# Patient Record
Sex: MALE | Race: OTHER | HISPANIC OR LATINO | URBAN - METROPOLITAN AREA
[De-identification: names, ages, dates, MRNs, and addresses within clinical notes are randomized per-mention and may not be internally consistent; named-entity substitution may affect disease eponyms.]

---

## 2017-05-20 ENCOUNTER — INPATIENT (INPATIENT)
Facility: HOSPITAL | Age: 37
LOS: 8 days | Discharge: HOME | DRG: 167 | End: 2017-05-29
Attending: FAMILY MEDICINE | Admitting: FAMILY MEDICINE
Payer: MEDICAID

## 2017-05-20 VITALS
HEIGHT: 65 IN | DIASTOLIC BLOOD PRESSURE: 102 MMHG | OXYGEN SATURATION: 96 % | HEART RATE: 114 BPM | WEIGHT: 145.06 LBS | RESPIRATION RATE: 20 BRPM | SYSTOLIC BLOOD PRESSURE: 154 MMHG | TEMPERATURE: 98 F

## 2017-05-20 DIAGNOSIS — R07.9 CHEST PAIN, UNSPECIFIED: ICD-10-CM

## 2017-05-20 DIAGNOSIS — R91.8 OTHER NONSPECIFIC ABNORMAL FINDING OF LUNG FIELD: ICD-10-CM

## 2017-05-20 DIAGNOSIS — Z29.9 ENCOUNTER FOR PROPHYLACTIC MEASURES, UNSPECIFIED: ICD-10-CM

## 2017-05-20 LAB
ALBUMIN SERPL ELPH-MCNC: 3.8 G/DL — SIGNIFICANT CHANGE UP (ref 3.5–5)
ALP SERPL-CCNC: 106 U/L — SIGNIFICANT CHANGE UP (ref 40–120)
ALT FLD-CCNC: 47 U/L DA — SIGNIFICANT CHANGE UP (ref 10–60)
ANION GAP SERPL CALC-SCNC: 7 MMOL/L — SIGNIFICANT CHANGE UP (ref 5–17)
AST SERPL-CCNC: 38 U/L — SIGNIFICANT CHANGE UP (ref 10–40)
BASOPHILS # BLD AUTO: 0.1 K/UL — SIGNIFICANT CHANGE UP (ref 0–0.2)
BASOPHILS NFR BLD AUTO: 1 % — SIGNIFICANT CHANGE UP (ref 0–2)
BILIRUB SERPL-MCNC: 0.4 MG/DL — SIGNIFICANT CHANGE UP (ref 0.2–1.2)
BUN SERPL-MCNC: 14 MG/DL — SIGNIFICANT CHANGE UP (ref 7–18)
CALCIUM SERPL-MCNC: 8.6 MG/DL — SIGNIFICANT CHANGE UP (ref 8.4–10.5)
CHLORIDE SERPL-SCNC: 106 MMOL/L — SIGNIFICANT CHANGE UP (ref 96–108)
CO2 SERPL-SCNC: 27 MMOL/L — SIGNIFICANT CHANGE UP (ref 22–31)
CREAT SERPL-MCNC: 0.93 MG/DL — SIGNIFICANT CHANGE UP (ref 0.5–1.3)
EOSINOPHIL # BLD AUTO: 0.6 K/UL — HIGH (ref 0–0.5)
EOSINOPHIL NFR BLD AUTO: 6 % — SIGNIFICANT CHANGE UP (ref 0–6)
ETHANOL SERPL-MCNC: <3 MG/DL — SIGNIFICANT CHANGE UP (ref 0–10)
GLUCOSE SERPL-MCNC: 86 MG/DL — SIGNIFICANT CHANGE UP (ref 70–99)
HCT VFR BLD CALC: 42.1 % — SIGNIFICANT CHANGE UP (ref 39–50)
HGB BLD-MCNC: 14.8 G/DL — SIGNIFICANT CHANGE UP (ref 13–17)
LIDOCAIN IGE QN: 148 U/L — SIGNIFICANT CHANGE UP (ref 73–393)
LYMPHOCYTES # BLD AUTO: 1.3 K/UL — SIGNIFICANT CHANGE UP (ref 1–3.3)
LYMPHOCYTES # BLD AUTO: 12.4 % — LOW (ref 13–44)
MCHC RBC-ENTMCNC: 33 PG — SIGNIFICANT CHANGE UP (ref 27–34)
MCHC RBC-ENTMCNC: 35.1 GM/DL — SIGNIFICANT CHANGE UP (ref 32–36)
MCV RBC AUTO: 93.9 FL — SIGNIFICANT CHANGE UP (ref 80–100)
MONOCYTES # BLD AUTO: 0.4 K/UL — SIGNIFICANT CHANGE UP (ref 0–0.9)
MONOCYTES NFR BLD AUTO: 4.1 % — SIGNIFICANT CHANGE UP (ref 2–14)
NEUTROPHILS # BLD AUTO: 7.9 K/UL — HIGH (ref 1.8–7.4)
NEUTROPHILS NFR BLD AUTO: 76.6 % — SIGNIFICANT CHANGE UP (ref 43–77)
PLATELET # BLD AUTO: 194 K/UL — SIGNIFICANT CHANGE UP (ref 150–400)
POTASSIUM SERPL-MCNC: 3.8 MMOL/L — SIGNIFICANT CHANGE UP (ref 3.5–5.3)
POTASSIUM SERPL-SCNC: 3.8 MMOL/L — SIGNIFICANT CHANGE UP (ref 3.5–5.3)
PROT SERPL-MCNC: 7.7 G/DL — SIGNIFICANT CHANGE UP (ref 6–8.3)
RBC # BLD: 4.49 M/UL — SIGNIFICANT CHANGE UP (ref 4.2–5.8)
RBC # FLD: 13.5 % — SIGNIFICANT CHANGE UP (ref 10.3–14.5)
SODIUM SERPL-SCNC: 140 MMOL/L — SIGNIFICANT CHANGE UP (ref 135–145)
TROPONIN I SERPL-MCNC: <0.015 NG/ML — SIGNIFICANT CHANGE UP (ref 0–0.04)
WBC # BLD: 10.3 K/UL — SIGNIFICANT CHANGE UP (ref 3.8–10.5)
WBC # FLD AUTO: 10.3 K/UL — SIGNIFICANT CHANGE UP (ref 3.8–10.5)

## 2017-05-20 PROCEDURE — 71020: CPT | Mod: 26

## 2017-05-20 PROCEDURE — 71275 CT ANGIOGRAPHY CHEST: CPT | Mod: 26

## 2017-05-20 PROCEDURE — 99285 EMERGENCY DEPT VISIT HI MDM: CPT

## 2017-05-20 RX ORDER — TUBERCULIN PURIFIED PROTEIN DERIVATIVE 5 [IU]/.1ML
5 INJECTION, SOLUTION INTRADERMAL ONCE
Qty: 0 | Refills: 0 | Status: COMPLETED | OUTPATIENT
Start: 2017-05-20 | End: 2017-05-20

## 2017-05-20 RX ORDER — SODIUM CHLORIDE 9 MG/ML
3 INJECTION INTRAMUSCULAR; INTRAVENOUS; SUBCUTANEOUS DAILY
Qty: 0 | Refills: 0 | Status: DISCONTINUED | OUTPATIENT
Start: 2017-05-20 | End: 2017-05-29

## 2017-05-20 RX ORDER — ATORVASTATIN CALCIUM 80 MG/1
40 TABLET, FILM COATED ORAL AT BEDTIME
Qty: 0 | Refills: 0 | Status: DISCONTINUED | OUTPATIENT
Start: 2017-05-20 | End: 2017-05-29

## 2017-05-20 RX ORDER — ASPIRIN/CALCIUM CARB/MAGNESIUM 324 MG
81 TABLET ORAL DAILY
Qty: 0 | Refills: 0 | Status: DISCONTINUED | OUTPATIENT
Start: 2017-05-20 | End: 2017-05-29

## 2017-05-20 RX ORDER — SODIUM CHLORIDE 9 MG/ML
1000 INJECTION INTRAMUSCULAR; INTRAVENOUS; SUBCUTANEOUS ONCE
Qty: 0 | Refills: 0 | Status: COMPLETED | OUTPATIENT
Start: 2017-05-20 | End: 2017-05-20

## 2017-05-20 RX ORDER — MORPHINE SULFATE 50 MG/1
4 CAPSULE, EXTENDED RELEASE ORAL ONCE
Qty: 0 | Refills: 0 | Status: DISCONTINUED | OUTPATIENT
Start: 2017-05-20 | End: 2017-05-20

## 2017-05-20 RX ADMIN — SODIUM CHLORIDE 1000 MILLILITER(S): 9 INJECTION INTRAMUSCULAR; INTRAVENOUS; SUBCUTANEOUS at 14:15

## 2017-05-20 RX ADMIN — Medication 1 MILLIGRAM(S): at 14:14

## 2017-05-20 RX ADMIN — TUBERCULIN PURIFIED PROTEIN DERIVATIVE 5 UNIT(S): 5 INJECTION, SOLUTION INTRADERMAL at 21:55

## 2017-05-20 RX ADMIN — MORPHINE SULFATE 4 MILLIGRAM(S): 50 CAPSULE, EXTENDED RELEASE ORAL at 14:14

## 2017-05-20 RX ADMIN — MORPHINE SULFATE 4 MILLIGRAM(S): 50 CAPSULE, EXTENDED RELEASE ORAL at 20:30

## 2017-05-20 NOTE — H&P ADULT - RS GEN PE MLT RESP DETAILS PC
breath sounds equal/no intercostal retractions/no rales/airway patent/normal/good air movement/clear to auscultation bilaterally/no wheezes/no chest wall tenderness/no subcutaneous emphysema/respirations non-labored/no rhonchi

## 2017-05-20 NOTE — H&P ADULT - HISTORY OF PRESENT ILLNESS
37 y/o M from home. No significant PMH. Presents to ED complaining chest pain, described as sharp, 8/10, localized and pounding since today. Pt had been massaging area over shirt to relieve pain and now has a 2nd degree burn on the area. Also complains of occasional night sweats. Pt has never seen a doctor or had regular blood test before. Denies nausea, vomiting, diarrhea, abdominal pain, GERD, weight loss, sick contacts or any other complaints.    Social Hx: Pt confirms drinking alcohol socially, on weekends. Last drink yesterday at a party 10 beers. Works as a . Heavy smoker 3-4 cigarets a day for the past 8 years.

## 2017-05-20 NOTE — H&P ADULT - ASSESSMENT
35 y/o M from home. No significant PMH. Presents to ED complaining chest pain, described as sharp, 8/10, localized and pounding since today. Pt had been massaging area over shirt to relieve pain and now has a 2nd degree burn on the area. Also complains of occasional night sweats. Pt has never seen a doctor or had regular blood test before.

## 2017-05-20 NOTE — H&P ADULT - NEGATIVE SKIN SYMPTOMS
no rash/no hair loss/no tumor/no pitted nails/no change in size/color of mole/no itching/no brittle nails/no dryness

## 2017-05-20 NOTE — H&P ADULT - NEGATIVE ENMT SYMPTOMS
no tinnitus/no vertigo/no dysphagia/no ear pain/no dry mouth/no nasal discharge/no sinus symptoms/no abnormal taste sensation/no throat pain/no recurrent cold sores/no gum bleeding/no post-nasal discharge/no nose bleeds/no nasal obstruction/no nasal congestion/no hearing difficulty

## 2017-05-20 NOTE — H&P ADULT - PROBLEM SELECTOR PLAN 1
pt was admitted due to chest pain   on CTA with B/L apical masslike consolidations with ground-glass opacities in the periphery of the prosthesis worrisome for infectious, inflammatory or neoplastic disease. No definite cavitation seen but unable to r/o TB   pt is active smoker and reported exposed to dust at work, denies SOB   pt has never been tested for TB and reported occasional night sweats  c/w isolation precautions until TB ruled out   f/u acid fast sputum Cx   f/u sputum Cx  f/u Blood Cx  ID consulted    Pulmonology consulted  pt was admitted due to chest pain   on CTA with B/L apical masslike consolidations with ground-glass opacities in the periphery of the prosthesis worrisome for infectious, inflammatory or neoplastic disease. No definite cavitation seen but unable to r/o TB   pt is active smoker and reported exposed to dust at work, denies SOB   pt has never been tested for TB and reported occasional night sweats  c/w isolation precautions until TB ruled out   f/u acid fast sputum Cx   f/u sputum Cx  f/u Blood Cx  ID consulted Dr. Nogueira   Pulmonology consulted Dr. Barillas pt was admitted due to chest pain   on CTA with B/L apical masslike consolidations with ground-glass opacities in the periphery of the prosthesis worrisome for infectious, inflammatory or neoplastic disease. No definite cavitation seen but unable to r/o TB   pt is active smoker and reported exposed to dust at work, denies SOB   pt has never been tested for TB and reported occasional night sweats  L arm PPD placed on 5/20 at 21:55   c/w isolation precautions until TB ruled out   f/u acid fast sputum Cx   f/u PPD read in 48 hrs  f/u sputum Cx  f/u Blood Cx  ID consulted Dr. Nogueira   Pulmonology consulted Dr. Barillas

## 2017-05-20 NOTE — ED PROVIDER NOTE - OBJECTIVE STATEMENT
35 y/o M with no significant PMHx presents to ED c/o chest pain, described as strong and pounding x today. Pt had been massaging area over shirt to relieve pain and now has a 2nd degree burn on the area. Pt confirms drinking alcohol socially, on weekends. Denies any psych history. Also denies any nausea, vomiting, diarrhea or any other complaints. NKDA.

## 2017-05-20 NOTE — H&P ADULT - NEGATIVE GENERAL SYMPTOMS
no sweating/no fatigue/no chills/no polydipsia/no polyuria/no fever/no weight gain/no polyphagia/no malaise/no anorexia/no weight loss

## 2017-05-20 NOTE — ED PROVIDER NOTE - SKIN WOUND DESCRIPTION
2nd degree burn 2/2 friction from rubbing his shirt against his skin to alleviate pain, involves nipple

## 2017-05-20 NOTE — H&P ADULT - NEGATIVE NEUROLOGICAL SYMPTOMS
no facial palsy/no paresthesias/no headache/no tremors/no loss of consciousness/no hemiparesis/no transient paralysis/no loss of sensation/no confusion/no syncope/no generalized seizures/no vertigo/no difficulty walking/no weakness/no focal seizures

## 2017-05-20 NOTE — H&P ADULT - GASTROINTESTINAL DETAILS
soft/normal/no rebound tenderness/nontender/no organomegaly/bowel sounds normal/no bruit/no rigidity/no guarding/no distention/no masses palpable

## 2017-05-20 NOTE — ED PROVIDER NOTE - MEDICAL DECISION MAKING DETAILS
pt with left sided chest pounding , h/o binge drinking on weekend, no fever no night sweats no weight loss

## 2017-05-20 NOTE — H&P ADULT - NEGATIVE GASTROINTESTINAL SYMPTOMS
no nausea/no constipation/no change in bowel habits/no hiccoughs/no flatulence/no vomiting/no abdominal pain/no hematochezia/no steatorrhea/no jaundice/no diarrhea/no melena

## 2017-05-20 NOTE — H&P ADULT - ATTENDING COMMENTS
patient seen and examined. case fully discussed with  ER attending and medical resident. reviewed chief complaint. reviewed review of systems. reviewed list of medication,  reviewed physical exam. reviewed assessment and plan. agree with full H and P. will follow up xray findings and obtain pulmonary consult.

## 2017-05-20 NOTE — H&P ADULT - NEUROLOGICAL DETAILS
responds to pain/normal strength/no spontaneous movement/superficial reflexes intact/deep reflexes intact/cranial nerves intact/alert and oriented x 3/sensation intact/responds to verbal commands

## 2017-05-20 NOTE — H&P ADULT - NEGATIVE MUSCULOSKELETAL SYMPTOMS
no arthralgia/no joint swelling/no arthritis/no stiffness/no neck pain/no myalgia/no muscle cramps/no muscle weakness

## 2017-05-20 NOTE — H&P ADULT - PROBLEM SELECTOR PLAN 2
pt presented with chest pain  on admission T1 negative, EKG NS tachycardia   f/u lipid profile, HbA1C and TSH

## 2017-05-20 NOTE — H&P ADULT - NEGATIVE CARDIOVASCULAR SYMPTOMS
no claudication/no orthopnea/no dyspnea on exertion/no peripheral edema/no paroxysmal nocturnal dyspnea

## 2017-05-21 DIAGNOSIS — J18.9 PNEUMONIA, UNSPECIFIED ORGANISM: ICD-10-CM

## 2017-05-21 LAB
ALBUMIN SERPL ELPH-MCNC: 3.6 G/DL — SIGNIFICANT CHANGE UP (ref 3.5–5)
ALP SERPL-CCNC: 109 U/L — SIGNIFICANT CHANGE UP (ref 40–120)
ALT FLD-CCNC: 44 U/L DA — SIGNIFICANT CHANGE UP (ref 10–60)
ANION GAP SERPL CALC-SCNC: 8 MMOL/L — SIGNIFICANT CHANGE UP (ref 5–17)
AST SERPL-CCNC: 27 U/L — SIGNIFICANT CHANGE UP (ref 10–40)
BASOPHILS # BLD AUTO: 0.1 K/UL — SIGNIFICANT CHANGE UP (ref 0–0.2)
BASOPHILS NFR BLD AUTO: 1 % — SIGNIFICANT CHANGE UP (ref 0–2)
BILIRUB SERPL-MCNC: 0.8 MG/DL — SIGNIFICANT CHANGE UP (ref 0.2–1.2)
BUN SERPL-MCNC: 13 MG/DL — SIGNIFICANT CHANGE UP (ref 7–18)
CALCIUM SERPL-MCNC: 8.5 MG/DL — SIGNIFICANT CHANGE UP (ref 8.4–10.5)
CHLORIDE SERPL-SCNC: 107 MMOL/L — SIGNIFICANT CHANGE UP (ref 96–108)
CHOLEST SERPL-MCNC: 179 MG/DL — SIGNIFICANT CHANGE UP (ref 10–199)
CK MB BLD-MCNC: <0.5 % — SIGNIFICANT CHANGE UP (ref 0–3.5)
CK MB BLD-MCNC: <0.6 % — SIGNIFICANT CHANGE UP (ref 0–3.5)
CK MB CFR SERPL CALC: <1 NG/ML — SIGNIFICANT CHANGE UP (ref 0–3.6)
CK MB CFR SERPL CALC: <1 NG/ML — SIGNIFICANT CHANGE UP (ref 0–3.6)
CK SERPL-CCNC: 172 U/L — SIGNIFICANT CHANGE UP (ref 35–232)
CK SERPL-CCNC: 182 U/L — SIGNIFICANT CHANGE UP (ref 35–232)
CO2 SERPL-SCNC: 26 MMOL/L — SIGNIFICANT CHANGE UP (ref 22–31)
CREAT SERPL-MCNC: 0.8 MG/DL — SIGNIFICANT CHANGE UP (ref 0.5–1.3)
EOSINOPHIL # BLD AUTO: 0.7 K/UL — HIGH (ref 0–0.5)
EOSINOPHIL NFR BLD AUTO: 11.3 % — HIGH (ref 0–6)
GLUCOSE SERPL-MCNC: 77 MG/DL — SIGNIFICANT CHANGE UP (ref 70–99)
HBA1C BLD-MCNC: 5.2 % — SIGNIFICANT CHANGE UP (ref 4–5.6)
HCT VFR BLD CALC: 45.4 % — SIGNIFICANT CHANGE UP (ref 39–50)
HDLC SERPL-MCNC: 50 MG/DL — SIGNIFICANT CHANGE UP (ref 40–125)
HGB BLD-MCNC: 15 G/DL — SIGNIFICANT CHANGE UP (ref 13–17)
HIV 1+2 AB+HIV1 P24 AG SERPL QL IA: SIGNIFICANT CHANGE UP
LIPID PNL WITH DIRECT LDL SERPL: 112 MG/DL — SIGNIFICANT CHANGE UP
LYMPHOCYTES # BLD AUTO: 1.1 K/UL — SIGNIFICANT CHANGE UP (ref 1–3.3)
LYMPHOCYTES # BLD AUTO: 17.5 % — SIGNIFICANT CHANGE UP (ref 13–44)
MAGNESIUM SERPL-MCNC: 2.3 MG/DL — SIGNIFICANT CHANGE UP (ref 1.6–2.6)
MCHC RBC-ENTMCNC: 32.2 PG — SIGNIFICANT CHANGE UP (ref 27–34)
MCHC RBC-ENTMCNC: 33 GM/DL — SIGNIFICANT CHANGE UP (ref 32–36)
MCV RBC AUTO: 97.8 FL — SIGNIFICANT CHANGE UP (ref 80–100)
MONOCYTES # BLD AUTO: 0.5 K/UL — SIGNIFICANT CHANGE UP (ref 0–0.9)
MONOCYTES NFR BLD AUTO: 7 % — SIGNIFICANT CHANGE UP (ref 2–14)
NEUTROPHILS # BLD AUTO: 4.1 K/UL — SIGNIFICANT CHANGE UP (ref 1.8–7.4)
NEUTROPHILS NFR BLD AUTO: 63.1 % — SIGNIFICANT CHANGE UP (ref 43–77)
PCP SPEC-MCNC: SIGNIFICANT CHANGE UP
PHOSPHATE SERPL-MCNC: 2.2 MG/DL — LOW (ref 2.5–4.5)
PLATELET # BLD AUTO: 207 K/UL — SIGNIFICANT CHANGE UP (ref 150–400)
POTASSIUM SERPL-MCNC: 3.6 MMOL/L — SIGNIFICANT CHANGE UP (ref 3.5–5.3)
POTASSIUM SERPL-SCNC: 3.6 MMOL/L — SIGNIFICANT CHANGE UP (ref 3.5–5.3)
PROT SERPL-MCNC: 7.5 G/DL — SIGNIFICANT CHANGE UP (ref 6–8.3)
RBC # BLD: 4.64 M/UL — SIGNIFICANT CHANGE UP (ref 4.2–5.8)
RBC # FLD: 12.4 % — SIGNIFICANT CHANGE UP (ref 10.3–14.5)
SODIUM SERPL-SCNC: 141 MMOL/L — SIGNIFICANT CHANGE UP (ref 135–145)
TOTAL CHOLESTEROL/HDL RATIO MEASUREMENT: 3.6 RATIO — SIGNIFICANT CHANGE UP (ref 3.4–9.6)
TRIGL SERPL-MCNC: 84 MG/DL — SIGNIFICANT CHANGE UP (ref 10–149)
TROPONIN I SERPL-MCNC: <0.015 NG/ML — SIGNIFICANT CHANGE UP (ref 0–0.04)
TROPONIN I SERPL-MCNC: <0.015 NG/ML — SIGNIFICANT CHANGE UP (ref 0–0.04)
WBC # BLD: 6.4 K/UL — SIGNIFICANT CHANGE UP (ref 3.8–10.5)
WBC # FLD AUTO: 6.4 K/UL — SIGNIFICANT CHANGE UP (ref 3.8–10.5)

## 2017-05-21 RX ORDER — SODIUM,POTASSIUM PHOSPHATES 278-250MG
1 POWDER IN PACKET (EA) ORAL
Qty: 0 | Refills: 0 | Status: COMPLETED | OUTPATIENT
Start: 2017-05-21 | End: 2017-05-22

## 2017-05-21 RX ADMIN — Medication 1 TABLET(S): at 18:09

## 2017-05-21 RX ADMIN — Medication 1 TABLET(S): at 21:04

## 2017-05-21 RX ADMIN — Medication 1 TABLET(S): at 12:16

## 2017-05-21 RX ADMIN — Medication 81 MILLIGRAM(S): at 12:16

## 2017-05-21 RX ADMIN — ATORVASTATIN CALCIUM 40 MILLIGRAM(S): 80 TABLET, FILM COATED ORAL at 21:04

## 2017-05-21 NOTE — CONSULT NOTE ADULT - PROBLEM SELECTOR RECOMMENDATION 9
Bronchoscopy with biopsy if not improved with antibiotics Bronchoscopy with biopsy if not improved with antibiotics  Quantiferon TB Gold test

## 2017-05-21 NOTE — CONSULT NOTE ADULT - SUBJECTIVE AND OBJECTIVE BOX
PULMONARY CONSULT NOTE      JESSE DIAZ  MRN-[id number]    Patient is a 36y old  Male who presents with a chief complaint of chest pain (20 May 2017 19:46)      HISTORY OF PRESENT ILLNESS:    MEDICATIONS  (STANDING):  sodium chloride 3%  Inhalation 3milliLiter(s) Inhalation daily  aspirin enteric coated 81milliGRAM(s) Oral daily  atorvastatin 40milliGRAM(s) Oral at bedtime  potassium acid phosphate/sodium acid phosphate tablet (K-PHOS No. 2) 1Tablet(s) Oral four times a day with meals      MEDICATIONS  (PRN):      Allergies    No Known Allergies    Intolerances        PAST MEDICAL & SURGICAL HISTORY:  No pertinent past medical history  No significant past surgical history      FAMILY HISTORY:  Family history of hypertension in mother (Mother)      SOCIAL HISTORY  Smoking History:     REVIEW OF SYSTEMS:    CONSTITUTIONAL:  No fevers, chills, sweats    HEENT:  Eyes:  No diplopia or blurred vision. ENT:  No earache, sore throat or runny nose.    CARDIOVASCULAR:  No pressure, squeezing, tightness, or heaviness about the chest; no palpitations.    RESPIRATORY:  Per HPI    GASTROINTESTINAL:  No abdominal pain, nausea, vomiting or diarrhea.    GENITOURINARY:  No dysuria, frequency or urgency.    NEUROLOGIC:  No paresthesias, fasciculations, seizures or weakness.    PSYCHIATRIC:  No disorder of thought or mood.    Vital Signs Last 24 Hrs  T(C): 36.7, Max: 37.1 (05-20 @ 16:30)  T(F): 98, Max: 98.7 (05-20 @ 16:30)  HR: 71 (64 - 88)  BP: 119/67 (100/71 - 136/78)  BP(mean): --  RR: 18 (16 - 100)  SpO2: 100% (98% - 100%)  I&O's Detail      PHYSICAL EXAMINATION:    GENERAL: The patient is a well-developed, well-nourished _____in no apparent distress.     HEENT: Head is normocephalic and atraumatic. Extraocular muscles are intact. Mucous membranes are moist.     NECK: Supple.     LUNGS: Clear to auscultation without wheezing, rales, or rhonchi. Respirations unlabored    HEART: Regular rate and rhythm without murmur.    ABDOMEN: Soft, nontender, and nondistended.  No hepatosplenomegaly is noted.    EXTREMITIES: Without any cyanosis, clubbing, rash, lesions or edema.    NEUROLOGIC: Grossly intact.      LABS:                        15.0   6.4   )-----------( 207      ( 21 May 2017 07:49 )             45.4     05-21    141  |  107  |  13  ----------------------------<  77  3.6   |  26  |  0.80    Ca    8.5      21 May 2017 07:49  Phos  2.2     05-21  Mg     2.3     05-21    TPro  7.5  /  Alb  3.6  /  TBili  0.8  /  DBili  x   /  AST  27  /  ALT  44  /  AlkPhos  109  05-21          CARDIAC MARKERS ( 21 May 2017 07:49 )  <0.015 ng/mL / x     / 172 U/L / x     / <1.0 ng/mL  CARDIAC MARKERS ( 21 May 2017 00:41 )  <0.015 ng/mL / x     / 182 U/L / x     / <1.0 ng/mL  CARDIAC MARKERS ( 20 May 2017 14:16 )  <0.015 ng/mL / x     / x     / x     / x                    MICROBIOLOGY:    RADIOLOGY & ADDITIONAL STUDIES:    CXR:    Ct scan chest:    ekg;    echo: PULMONARY CONSULT NOTE      JESSE DIAZ  MRN-[id number]    Patient is a 36y old  Male who presents with a chief complaint of chest pain (20 May 2017 19:46),        HISTORY OF PRESENT ILLNESS:  Patient is admitted to due to chest pain, 8/10,pounding, with occasional night sweats. No significant PMHx. Patient is heavy smoker, 3-4 cigarrettes a day for 8 years.        MEDICATIONS  (STANDING):  sodium chloride 3%  Inhalation 3milliLiter(s) Inhalation daily  aspirin enteric coated 81milliGRAM(s) Oral daily  atorvastatin 40milliGRAM(s) Oral at bedtime  potassium acid phosphate/sodium acid phosphate tablet (K-PHOS No. 2) 1Tablet(s) Oral four times a day with meals      MEDICATIONS  (PRN):      Allergies    No Known Allergies    Intolerances        PAST MEDICAL & SURGICAL HISTORY:  No pertinent past medical history  No significant past surgical history      FAMILY HISTORY:  Family history of hypertension in mother (Mother)      SOCIAL HISTORY  Smoking History:     REVIEW OF SYSTEMS:    CONSTITUTIONAL:  No fevers, chills, sweats    HEENT:  Eyes:  No diplopia or blurred vision. ENT:  No earache, sore throat or runny nose.    CARDIOVASCULAR:  No pressure, squeezing, tightness, or heaviness about the chest; no palpitations.    RESPIRATORY:  Per HPI    GASTROINTESTINAL:  No abdominal pain, nausea, vomiting or diarrhea.    GENITOURINARY:  No dysuria, frequency or urgency.    NEUROLOGIC:  No paresthesias, fasciculations, seizures or weakness.    PSYCHIATRIC:  No disorder of thought or mood.    Vital Signs Last 24 Hrs  T(C): 36.7, Max: 37.1 (05-20 @ 16:30)  T(F): 98, Max: 98.7 (05-20 @ 16:30)  HR: 71 (64 - 88)  BP: 119/67 (100/71 - 136/78)  BP(mean): --  RR: 18 (16 - 100)  SpO2: 100% (98% - 100%)  I&O's Detail      PHYSICAL EXAMINATION:    GENERAL: The patient is a well-developed, well-nourished _____in no apparent distress.     HEENT: Head is normocephalic and atraumatic. Extraocular muscles are intact. Mucous membranes are moist.     NECK: Supple.     LUNGS: Clear to auscultation without wheezing, rales, or rhonchi. Respirations unlabored    HEART: Regular rate and rhythm without murmur.    ABDOMEN: Soft, nontender, and nondistended.  No hepatosplenomegaly is noted.    EXTREMITIES: Without any cyanosis, clubbing, rash, lesions or edema.    NEUROLOGIC: Grossly intact.      LABS:                        15.0   6.4   )-----------( 207      ( 21 May 2017 07:49 )             45.4     05-21    141  |  107  |  13  ----------------------------<  77  3.6   |  26  |  0.80    Ca    8.5      21 May 2017 07:49  Phos  2.2     05-21  Mg     2.3     05-21    TPro  7.5  /  Alb  3.6  /  TBili  0.8  /  DBili  x   /  AST  27  /  ALT  44  /  AlkPhos  109  05-21          CARDIAC MARKERS ( 21 May 2017 07:49 )  <0.015 ng/mL / x     / 172 U/L / x     / <1.0 ng/mL  CARDIAC MARKERS ( 21 May 2017 00:41 )  <0.015 ng/mL / x     / 182 U/L / x     / <1.0 ng/mL  CARDIAC MARKERS ( 20 May 2017 14:16 )  <0.015 ng/mL / x     / x     / x     / x                    MICROBIOLOGY:    RADIOLOGY & ADDITIONAL STUDIES:    CXR:  IMPRESSION: Bilateral upper lobe opacities, possibly infectious,   inflammatory or neoplastic. Please correlate clinically.  Ct scan chest:  IMPRESSION:  No evidenceof a pulmonary embolism. Bilateral apical masslike   consolidations with groundglass opacities in the periphery of the   prosthesis worrisome for infectious, inflammatory or neoplastic disease.   Although there is no definite cavitation the possibility of TB cannot be   entirely excluded. Please correlate clinically.  ekg;    echo: PULMONARY CONSULT NOTE      JESSE DIAZ  [id number]    Patient is a 36y old  Male who presents with a chief complaint of chest pain (20 May 2017 19:46),        HISTORY OF PRESENT ILLNESS:  Patient is admitted due to chest pain, 8/10,pounding, with occasional night sweats. No significant PMHx. Patient is heavy smoker, 3-4 cigarrettes a day for 8 years.        MEDICATIONS  (STANDING):  sodium chloride 3%  Inhalation 3milliLiter(s) Inhalation daily  aspirin enteric coated 81milliGRAM(s) Oral daily  atorvastatin 40milliGRAM(s) Oral at bedtime  potassium acid phosphate/sodium acid phosphate tablet (K-PHOS No. 2) 1Tablet(s) Oral four times a day with meals      MEDICATIONS  (PRN):      Allergies    No Known Allergies    Intolerances        PAST MEDICAL & SURGICAL HISTORY:  No pertinent past medical history  No significant past surgical history      FAMILY HISTORY:  Family history of hypertension in mother (Mother)      SOCIAL HISTORY  Smoking History:     REVIEW OF SYSTEMS:    CONSTITUTIONAL:  Occasional night sweats, No fevers, chills,     HEENT:  Eyes:  No diplopia or blurred vision. ENT:  No earache, sore throat or runny nose.    CARDIOVASCULAR:  No pressure, squeezing, tightness, or heaviness about the chest; no palpitations.    RESPIRATORY:  Per HPI    GASTROINTESTINAL:  No abdominal pain, nausea, vomiting or diarrhea.    GENITOURINARY:  No dysuria, frequency or urgency.    NEUROLOGIC:  No paresthesias, fasciculations, seizures or weakness.    PSYCHIATRIC:  No disorder of thought or mood.    Vital Signs Last 24 Hrs  T(C): 36.7, Max: 37.1 (05-20 @ 16:30)  T(F): 98, Max: 98.7 (05-20 @ 16:30)  HR: 71 (64 - 88)  BP: 119/67 (100/71 - 136/78)  BP(mean): --  RR: 18 (16 - 100)  SpO2: 100% (98% - 100%)  I&O's Detail      PHYSICAL EXAMINATION:    GENERAL: The patient is a well-developed, well-nourished _____in no apparent distress.     HEENT: Head is normocephalic and atraumatic. Extraocular muscles are intact. Mucous membranes are moist.     NECK: Supple.     LUNGS: Clear to auscultation without wheezing, rales, or rhonchi. Respirations unlabored    HEART: Regular rate and rhythm without murmur.    ABDOMEN: Soft, nontender, and nondistended.  No hepatosplenomegaly is noted.    EXTREMITIES: Without any cyanosis, clubbing, rash, lesions or edema.    NEUROLOGIC: Grossly intact.      LABS:                        15.0   6.4   )-----------( 207      ( 21 May 2017 07:49 )             45.4     05-21    141  |  107  |  13  ----------------------------<  77  3.6   |  26  |  0.80    Ca    8.5      21 May 2017 07:49  Phos  2.2     05-21  Mg     2.3     05-21    TPro  7.5  /  Alb  3.6  /  TBili  0.8  /  DBili  x   /  AST  27  /  ALT  44  /  AlkPhos  109  05-21          CARDIAC MARKERS ( 21 May 2017 07:49 )  <0.015 ng/mL / x     / 172 U/L / x     / <1.0 ng/mL  CARDIAC MARKERS ( 21 May 2017 00:41 )  <0.015 ng/mL / x     / 182 U/L / x     / <1.0 ng/mL  CARDIAC MARKERS ( 20 May 2017 14:16 )  <0.015 ng/mL / x     / x     / x     / x                    MICROBIOLOGY:    RADIOLOGY & ADDITIONAL STUDIES:    CXR:  IMPRESSION: Bilateral upper lobe opacities, possibly infectious,   inflammatory or neoplastic. Please correlate clinically.  Ct scan chest:  IMPRESSION:  No evidenceof a pulmonary embolism. Bilateral apical masslike   consolidations with groundglass opacities in the periphery of the   prosthesis worrisome for infectious, inflammatory or neoplastic disease.   Although there is no definite cavitation the possibility of TB cannot be   entirely excluded. Please correlate clinically.  ekg;    echo: PULMONARY CONSULT NOTE      JESSE DIAZ  MRN-[id number]     Chief complaint of chest pain (20 May 2017 19:46),        HISTORY OF PRESENT ILLNESS:  Patient is admitted due to chest pain, 8/10,pounding, with occasional night sweats. No significant PMHx. Patient is heavy smoker, 3-4 cigarrettes a day for 8 years.        MEDICATIONS  (STANDING):  sodium chloride 3%  Inhalation 3milliLiter(s) Inhalation daily  aspirin enteric coated 81milliGRAM(s) Oral daily  atorvastatin 40milliGRAM(s) Oral at bedtime  potassium acid phosphate/sodium acid phosphate tablet (K-PHOS No. 2) 1Tablet(s) Oral four times a day with meals      MEDICATIONS  (PRN):      Allergies    No Known Allergies    Intolerances        PAST MEDICAL & SURGICAL HISTORY:  No pertinent past medical history  No significant past surgical history      FAMILY HISTORY:  Family history of hypertension in mother (Mother)      SOCIAL HISTORY  Smoking History:     REVIEW OF SYSTEMS:    CONSTITUTIONAL:  Occasional night sweats, No fevers, chills,     HEENT:  Eyes:  No diplopia or blurred vision. ENT:  No earache, sore throat or runny nose.    CARDIOVASCULAR:  No pressure, squeezing, tightness, or heaviness about the chest; no palpitations.    RESPIRATORY:  Per HPI    GASTROINTESTINAL:  No abdominal pain, nausea, vomiting or diarrhea.    GENITOURINARY:  No dysuria, frequency or urgency.    NEUROLOGIC:  No paresthesias, fasciculations, seizures or weakness.    PSYCHIATRIC:  No disorder of thought or mood.    Vital Signs Last 24 Hrs  T(C): 36.7, Max: 37.1 (05-20 @ 16:30)  T(F): 98, Max: 98.7 (05-20 @ 16:30)  HR: 71 (64 - 88)  BP: 119/67 (100/71 - 136/78)  BP(mean): --  RR: 18 (16 - 100)  SpO2: 100% (98% - 100%)  I&O's Detail      PHYSICAL EXAMINATION:    GENERAL: The patient is a well-developed, well-nourished _____in no apparent distress.     HEENT: Head is normocephalic and atraumatic. Extraocular muscles are intact. Mucous membranes are moist.     NECK: Supple.     LUNGS: Clear to auscultation without wheezing, rales, or rhonchi. Respirations unlabored    HEART: Regular rate and rhythm without murmur.    ABDOMEN: Soft, nontender, and nondistended.  No hepatosplenomegaly is noted.    EXTREMITIES: Without any cyanosis, clubbing, rash, lesions or edema.    NEUROLOGIC: Grossly intact.      LABS:                        15.0   6.4   )-----------( 207      ( 21 May 2017 07:49 )             45.4     05-21    141  |  107  |  13  ----------------------------<  77  3.6   |  26  |  0.80    Ca    8.5      21 May 2017 07:49  Phos  2.2     05-21  Mg     2.3     05-21    TPro  7.5  /  Alb  3.6  /  TBili  0.8  /  DBili  x   /  AST  27  /  ALT  44  /  AlkPhos  109  05-21          CARDIAC MARKERS ( 21 May 2017 07:49 )  <0.015 ng/mL / x     / 172 U/L / x     / <1.0 ng/mL  CARDIAC MARKERS ( 21 May 2017 00:41 )  <0.015 ng/mL / x     / 182 U/L / x     / <1.0 ng/mL  CARDIAC MARKERS ( 20 May 2017 14:16 )  <0.015 ng/mL / x     / x     / x     / x                    MICROBIOLOGY:    RADIOLOGY & ADDITIONAL STUDIES:    CXR:  IMPRESSION: Bilateral upper lobe opacities, possibly infectious,   inflammatory or neoplastic. Please correlate clinically.  Ct scan chest:  IMPRESSION:  No evidenceof a pulmonary embolism. Bilateral apical masslike   consolidations with groundglass opacities in the periphery of the   prosthesis worrisome for infectious, inflammatory or neoplastic disease.   Although there is no definite cavitation the possibility of TB cannot be   entirely excluded. Please correlate clinically.  ekg;    echo: PULMONARY CONSULT NOTE      JESSE DIAZ  MRN-[id number]     Chief complaint admission:chest pain (20 May 2017 19:46),        HISTORY OF PRESENT ILLNESS:  Patient is a 37 y/o M radha  is admitted to the ED due to chest pain, 8/10,pounding, with occasional night sweats. No significant PMHx. Patient is heavy smoker, 3-4 cigarrettes a day for 8 years.        MEDICATIONS  (STANDING):  sodium chloride 3%  Inhalation 3milliLiter(s) Inhalation daily  aspirin enteric coated 81milliGRAM(s) Oral daily  atorvastatin 40milliGRAM(s) Oral at bedtime  potassium acid phosphate/sodium acid phosphate tablet (K-PHOS No. 2) 1Tablet(s) Oral four times a day with meals      MEDICATIONS  (PRN):      Allergies    No Known Allergies    Intolerances        PAST MEDICAL & SURGICAL HISTORY:  No pertinent past medical history  No significant past surgical history      FAMILY HISTORY:  Family history of hypertension in mother (Mother)      SOCIAL HISTORY  Smoking History:     REVIEW OF SYSTEMS:    CONSTITUTIONAL:  Occasional night sweats, No fevers, chills,     HEENT:  Eyes:  No diplopia or blurred vision. ENT:  No earache, sore throat or runny nose.    CARDIOVASCULAR:  No pressure, squeezing, tightness, or heaviness about the chest; no palpitations.    RESPIRATORY:  Per HPI    GASTROINTESTINAL:  No abdominal pain, nausea, vomiting or diarrhea.    GENITOURINARY:  No dysuria, frequency or urgency.    NEUROLOGIC:  No paresthesias, fasciculations, seizures or weakness.    PSYCHIATRIC:  No disorder of thought or mood.    Vital Signs Last 24 Hrs  T(C): 36.7, Max: 37.1 (05-20 @ 16:30)  T(F): 98, Max: 98.7 (05-20 @ 16:30)  HR: 71 (64 - 88)  BP: 119/67 (100/71 - 136/78)  BP(mean): --  RR: 18 (16 - 100)  SpO2: 100% (98% - 100%)  I&O's Detail      PHYSICAL EXAMINATION:    GENERAL: The patient is a well-developed, well-nourished _____in no apparent distress.     HEENT: Head is normocephalic and atraumatic. Extraocular muscles are intact. Mucous membranes are moist.     NECK: Supple.     LUNGS: Clear to auscultation without wheezing, rales, or rhonchi. Respirations unlabored    HEART: Regular rate and rhythm without murmur.    ABDOMEN: Soft, nontender, and nondistended.  No hepatosplenomegaly is noted.    EXTREMITIES: Without any cyanosis, clubbing, rash, lesions or edema.    NEUROLOGIC: Grossly intact.      LABS:                        15.0   6.4   )-----------( 207      ( 21 May 2017 07:49 )             45.4     05-21    141  |  107  |  13  ----------------------------<  77  3.6   |  26  |  0.80    Ca    8.5      21 May 2017 07:49  Phos  2.2     05-21  Mg     2.3     05-21    TPro  7.5  /  Alb  3.6  /  TBili  0.8  /  DBili  x   /  AST  27  /  ALT  44  /  AlkPhos  109  05-21          CARDIAC MARKERS ( 21 May 2017 07:49 )  <0.015 ng/mL / x     / 172 U/L / x     / <1.0 ng/mL  CARDIAC MARKERS ( 21 May 2017 00:41 )  <0.015 ng/mL / x     / 182 U/L / x     / <1.0 ng/mL  CARDIAC MARKERS ( 20 May 2017 14:16 )  <0.015 ng/mL / x     / x     / x     / x                    MICROBIOLOGY:    RADIOLOGY & ADDITIONAL STUDIES:    CXR:  IMPRESSION: Bilateral upper lobe opacities, possibly infectious,   inflammatory or neoplastic. Please correlate clinically.  Ct scan chest:  IMPRESSION:  No evidenceof a pulmonary embolism. Bilateral apical masslike   consolidations with groundglass opacities in the periphery of the   prosthesis worrisome for infectious, inflammatory or neoplastic disease.   Although there is no definite cavitation the possibility of TB cannot be   entirely excluded. Please correlate clinically.  ekg;    echo: PULMONARY CONSULT NOTE      JESSE DIAZ  MRN-[id number]     Chief complaint admission:chest pain (20 May 2017 19:46),        HISTORY OF PRESENT ILLNESS:  Patient is a 37 y/o M that  is admitted to the ED due to chest pain, 8/10,pounding, with occasional night sweats. No significant PMHx. Patient is heavy smoker, 3-4 cigarrettes a day for 8 years. Denies cough, hemoptysis, weight loss or fever. No sob        MEDICATIONS  (STANDING):  sodium chloride 3%  Inhalation 3milliLiter(s) Inhalation daily  aspirin enteric coated 81milliGRAM(s) Oral daily  atorvastatin 40milliGRAM(s) Oral at bedtime  potassium acid phosphate/sodium acid phosphate tablet (K-PHOS No. 2) 1Tablet(s) Oral four times a day with meals      MEDICATIONS  (PRN):      Allergies    No Known Allergies    Intolerances        PAST MEDICAL & SURGICAL HISTORY:  No pertinent past medical history  No significant past surgical history      FAMILY HISTORY:  Family history of hypertension in mother (Mother)      SOCIAL HISTORY  Smoking History:     REVIEW OF SYSTEMS:    CONSTITUTIONAL:  Occasional night sweats, No fevers, chills,     HEENT:  Eyes:  No diplopia or blurred vision. ENT:  No earache, sore throat or runny nose.    CARDIOVASCULAR:  No pressure, squeezing, tightness, or heaviness about the chest; no palpitations.    RESPIRATORY:  Per HPI    GASTROINTESTINAL:  No abdominal pain, nausea, vomiting or diarrhea.    GENITOURINARY:  No dysuria, frequency or urgency.    NEUROLOGIC:  No paresthesias, fasciculations, seizures or weakness.    PSYCHIATRIC:  No disorder of thought or mood.    Vital Signs Last 24 Hrs  T(C): 36.7, Max: 37.1 (05-20 @ 16:30)  T(F): 98, Max: 98.7 (05-20 @ 16:30)  HR: 71 (64 - 88)  BP: 119/67 (100/71 - 136/78)  BP(mean): --  RR: 18 (16 - 100)  SpO2: 100% (98% - 100%)  I&O's Detail      PHYSICAL EXAMINATION:    GENERAL: The patient is a well-developed, well-nourished _____in no apparent distress.     HEENT: Head is normocephalic and atraumatic. Extraocular muscles are intact. Mucous membranes are moist.     NECK: Supple.     LUNGS: Clear to auscultation without wheezing, rales, or rhonchi. Respirations unlabored    HEART: Regular rate and rhythm without murmur.    ABDOMEN: Soft, nontender, and nondistended.  No hepatosplenomegaly is noted.    EXTREMITIES: Without any cyanosis, clubbing, rash, lesions or edema.    NEUROLOGIC: Grossly intact.      LABS:                        15.0   6.4   )-----------( 207      ( 21 May 2017 07:49 )             45.4     05-21    141  |  107  |  13  ----------------------------<  77  3.6   |  26  |  0.80    Ca    8.5      21 May 2017 07:49  Phos  2.2     05-21  Mg     2.3     05-21    TPro  7.5  /  Alb  3.6  /  TBili  0.8  /  DBili  x   /  AST  27  /  ALT  44  /  AlkPhos  109  05-21          CARDIAC MARKERS ( 21 May 2017 07:49 )  <0.015 ng/mL / x     / 172 U/L / x     / <1.0 ng/mL  CARDIAC MARKERS ( 21 May 2017 00:41 )  <0.015 ng/mL / x     / 182 U/L / x     / <1.0 ng/mL  CARDIAC MARKERS ( 20 May 2017 14:16 )  <0.015 ng/mL / x     / x     / x     / x                    MICROBIOLOGY:    RADIOLOGY & ADDITIONAL STUDIES:    CXR:  IMPRESSION: Bilateral upper lobe opacities, possibly infectious,   inflammatory or neoplastic. Please correlate clinically.  Ct scan chest:  IMPRESSION:  No evidenceof a pulmonary embolism. Bilateral apical masslike   consolidations with groundglass opacities in the periphery of the   prosthesis worrisome for infectious, inflammatory or neoplastic disease.   Although there is no definite cavitation the possibility of TB cannot be   entirely excluded. Please correlate clinically.  ekg;    echo: PULMONARY CONSULT NOTE      JESSE DIAZ  MRN-[id number]     Chief complaint admission:chest pain (20 May 2017 19:46),        HISTORY OF PRESENT ILLNESS:  Patient is a 35 y/o M that  is admitted to the ED due to chest pain, 8/10,pounding, with occasional night sweats. No significant PMHx. Patient is heavy smoker, 3-4 cigarrettes a day for 8 years. Denies cough, hemoptysis, weight loss or fever. No sob. Denies exposure to TB.        MEDICATIONS  (STANDING):  sodium chloride 3%  Inhalation 3milliLiter(s) Inhalation daily  aspirin enteric coated 81milliGRAM(s) Oral daily  atorvastatin 40milliGRAM(s) Oral at bedtime  potassium acid phosphate/sodium acid phosphate tablet (K-PHOS No. 2) 1Tablet(s) Oral four times a day with meals      MEDICATIONS  (PRN):      Allergies    No Known Allergies    Intolerances        PAST MEDICAL & SURGICAL HISTORY:  No pertinent past medical history  No significant past surgical history      FAMILY HISTORY:  Family history of hypertension in mother (Mother)      SOCIAL HISTORY  Smoking History:     REVIEW OF SYSTEMS:    CONSTITUTIONAL:  Occasional night sweats, No fevers, chills,     HEENT:  Eyes:  No diplopia or blurred vision. ENT:  No earache, sore throat or runny nose.    CARDIOVASCULAR:  No pressure, squeezing, tightness, or heaviness about the chest; no palpitations.    RESPIRATORY:  Per HPI    GASTROINTESTINAL:  No abdominal pain, nausea, vomiting or diarrhea.    GENITOURINARY:  No dysuria, frequency or urgency.    NEUROLOGIC:  No paresthesias, fasciculations, seizures or weakness.    PSYCHIATRIC:  No disorder of thought or mood.    Vital Signs Last 24 Hrs  T(C): 36.7, Max: 37.1 (05-20 @ 16:30)  T(F): 98, Max: 98.7 (05-20 @ 16:30)  HR: 71 (64 - 88)  BP: 119/67 (100/71 - 136/78)  BP(mean): --  RR: 18 (16 - 100)  SpO2: 100% (98% - 100%)  I&O's Detail      PHYSICAL EXAMINATION:    GENERAL: The patient is a well-developed, well-nourished _____in no apparent distress.     HEENT: Head is normocephalic and atraumatic. Extraocular muscles are intact. Mucous membranes are moist.     NECK: Supple.     LUNGS: Clear to auscultation without wheezing, rales, or rhonchi. Respirations unlabored    HEART: Regular rate and rhythm without murmur.    ABDOMEN: Soft, nontender, and nondistended.  No hepatosplenomegaly is noted.    EXTREMITIES: Without any cyanosis, clubbing, rash, lesions or edema.    NEUROLOGIC: Grossly intact.      LABS:                        15.0   6.4   )-----------( 207      ( 21 May 2017 07:49 )             45.4     05-21    141  |  107  |  13  ----------------------------<  77  3.6   |  26  |  0.80    Ca    8.5      21 May 2017 07:49  Phos  2.2     05-21  Mg     2.3     05-21    TPro  7.5  /  Alb  3.6  /  TBili  0.8  /  DBili  x   /  AST  27  /  ALT  44  /  AlkPhos  109  05-21          CARDIAC MARKERS ( 21 May 2017 07:49 )  <0.015 ng/mL / x     / 172 U/L / x     / <1.0 ng/mL  CARDIAC MARKERS ( 21 May 2017 00:41 )  <0.015 ng/mL / x     / 182 U/L / x     / <1.0 ng/mL  CARDIAC MARKERS ( 20 May 2017 14:16 )  <0.015 ng/mL / x     / x     / x     / x                    MICROBIOLOGY:    RADIOLOGY & ADDITIONAL STUDIES:    CXR:  IMPRESSION: Bilateral upper lobe opacities, possibly infectious,   inflammatory or neoplastic. Please correlate clinically.  Ct scan chest:  IMPRESSION:  No evidenceof a pulmonary embolism. Bilateral apical masslike   consolidations with groundglass opacities in the periphery of the   prosthesis worrisome for infectious, inflammatory or neoplastic disease.   Although there is no definite cavitation the possibility of TB cannot be   entirely excluded. Please correlate clinically.  ekg;    echo:

## 2017-05-22 DIAGNOSIS — B02.9 ZOSTER WITHOUT COMPLICATIONS: ICD-10-CM

## 2017-05-22 LAB
NIGHT BLUE STAIN TISS: SIGNIFICANT CHANGE UP
SPECIMEN SOURCE: SIGNIFICANT CHANGE UP

## 2017-05-22 RX ADMIN — ATORVASTATIN CALCIUM 40 MILLIGRAM(S): 80 TABLET, FILM COATED ORAL at 22:20

## 2017-05-22 RX ADMIN — Medication 1 TABLET(S): at 18:45

## 2017-05-22 RX ADMIN — Medication 81 MILLIGRAM(S): at 12:45

## 2017-05-22 NOTE — PROGRESS NOTE ADULT - SUBJECTIVE AND OBJECTIVE BOX
Patient is a 36y old  Male who presents with a chief complaint of chest pain (20 May 2017 19:46)      INTERVAL HPI/OVERNIGHT EVENTS:      VITAL SIGNS:  T(F): 98.7  HR: 78  BP: 117/79  RR: 16  SpO2: 95%  Wt(kg): --  I&O's Detail          REVIEW OF SYSTEMS:    CONSTITUTIONAL:  No fevers, chills, sweats    HEENT:  Eyes:  No diplopia or blurred vision. ENT:  No earache, sore throat or runny nose.    CARDIOVASCULAR:  No pressure, squeezing, tightness, or heaviness about the chest; no palpitations.    RESPIRATORY:  Per HPI    GASTROINTESTINAL:  No abdominal pain, nausea, vomiting or diarrhea.    GENITOURINARY:  No dysuria, frequency or urgency.    NEUROLOGIC:  No paresthesias, fasciculations, seizures or weakness.    PSYCHIATRIC:  No disorder of thought or mood.      PHYSICAL EXAM:    Constitutional: Well developed and nourished  Eyes:Perrla  ENMT: normal  Neck:supple  Respiratory: good air entry  Cardiovascular: S1 S2 regular  Gastrointestinal: Soft, Non tender  Extremities: No edema  Vascular:normal  Neurological:Awake, alert,Ox3  Musculoskeletal:Normal      MEDICATIONS  (STANDING):  sodium chloride 3%  Inhalation 3milliLiter(s) Inhalation daily  aspirin enteric coated 81milliGRAM(s) Oral daily  atorvastatin 40milliGRAM(s) Oral at bedtime  potassium acid phosphate/sodium acid phosphate tablet (K-PHOS No. 2) 1Tablet(s) Oral four times a day with meals    MEDICATIONS  (PRN):      Allergies    No Known Allergies    Intolerances        LABS:                        15.0   6.4   )-----------( 207      ( 21 May 2017 07:49 )             45.4     05-21    141  |  107  |  13  ----------------------------<  77  3.6   |  26  |  0.80    Ca    8.5      21 May 2017 07:49  Phos  2.2     05-21  Mg     2.3     05-21    TPro  7.5  /  Alb  3.6  /  TBili  0.8  /  DBili  x   /  AST  27  /  ALT  44  /  AlkPhos  109  05-21          CARDIAC MARKERS ( 21 May 2017 07:49 )  <0.015 ng/mL / x     / 172 U/L / x     / <1.0 ng/mL  CARDIAC MARKERS ( 21 May 2017 00:41 )  <0.015 ng/mL / x     / 182 U/L / x     / <1.0 ng/mL  CARDIAC MARKERS ( 20 May 2017 14:16 )  <0.015 ng/mL / x     / x     / x     / x          CAPILLARY BLOOD GLUCOSE        RADIOLOGY & ADDITIONAL TESTS:    CXR:  IMPRESSION: Bilateral upper lobe opacities, possibly infectious,   inflammatory or neoplastic. Please correlate clinically.    Ct scan chest:  IMPRESSION:  No evidenceof a pulmonary embolism. Bilateral apical masslike   consolidations with groundglass opacities in the periphery of the   prosthesis worrisome for infectious, inflammatory or neoplastic disease.   Although there is no definite cavitation the possibility of TB cannot be   entirely excluded. Please correlate clinically.    ekg;    echo: Patient is a 36y old  Male who presents with a chief complaint of chest pain (20 May 2017 19:46)  Awake, alert, comfortable in bed in NAD. Has an erythematous vesicular rash on left upper chest    INTERVAL HPI/OVERNIGHT EVENTS:      VITAL SIGNS:  T(F): 98.7  HR: 78  BP: 117/79  RR: 16  SpO2: 95%  Wt(kg): --  I&O's Detail          REVIEW OF SYSTEMS:    CONSTITUTIONAL:  No fevers, chills, sweats    HEENT:  Eyes:  No diplopia or blurred vision. ENT:  No earache, sore throat or runny nose.    CARDIOVASCULAR:  No pressure, squeezing, tightness, or heaviness about the chest; no palpitations.    RESPIRATORY:  Per HPI    GASTROINTESTINAL:  No abdominal pain, nausea, vomiting or diarrhea.    GENITOURINARY:  No dysuria, frequency or urgency.    NEUROLOGIC:  No paresthesias, fasciculations, seizures or weakness.    PSYCHIATRIC:  No disorder of thought or mood.      PHYSICAL EXAM:    Constitutional: Well developed and nourished  Eyes:Perrla  ENMT: normal  Neck:supple  Respiratory: good air entry  Cardiovascular: S1 S2 regular  Gastrointestinal: Soft, Non tender  Extremities: No edema  Vascular:normal  Neurological:Awake, alert,Ox3  Musculoskeletal:Erythematous-vesicular rash on left upper chest      MEDICATIONS  (STANDING):  sodium chloride 3%  Inhalation 3milliLiter(s) Inhalation daily  aspirin enteric coated 81milliGRAM(s) Oral daily  atorvastatin 40milliGRAM(s) Oral at bedtime  potassium acid phosphate/sodium acid phosphate tablet (K-PHOS No. 2) 1Tablet(s) Oral four times a day with meals    MEDICATIONS  (PRN):      Allergies    No Known Allergies    Intolerances        LABS:                        15.0   6.4   )-----------( 207      ( 21 May 2017 07:49 )             45.4     05-21    141  |  107  |  13  ----------------------------<  77  3.6   |  26  |  0.80    Ca    8.5      21 May 2017 07:49  Phos  2.2     05-21  Mg     2.3     05-21    TPro  7.5  /  Alb  3.6  /  TBili  0.8  /  DBili  x   /  AST  27  /  ALT  44  /  AlkPhos  109  05-21          CARDIAC MARKERS ( 21 May 2017 07:49 )  <0.015 ng/mL / x     / 172 U/L / x     / <1.0 ng/mL  CARDIAC MARKERS ( 21 May 2017 00:41 )  <0.015 ng/mL / x     / 182 U/L / x     / <1.0 ng/mL  CARDIAC MARKERS ( 20 May 2017 14:16 )  <0.015 ng/mL / x     / x     / x     / x          CAPILLARY BLOOD GLUCOSE        RADIOLOGY & ADDITIONAL TESTS:    CXR:  IMPRESSION: Bilateral upper lobe opacities, possibly infectious,   inflammatory or neoplastic. Please correlate clinically.    Ct scan chest:  IMPRESSION:  No evidenceof a pulmonary embolism. Bilateral apical masslike   consolidations with groundglass opacities in the periphery of the   prosthesis worrisome for infectious, inflammatory or neoplastic disease.   Although there is no definite cavitation the possibility of TB cannot be   entirely excluded. Please correlate clinically.    ekg;    echo:

## 2017-05-22 NOTE — PROGRESS NOTE ADULT - SUBJECTIVE AND OBJECTIVE BOX
Patient is a 36y old  Male who presents with a chief complaint of chest pain (20 May 2017 19:46)    HPI:  35 y/o M from home. No significant PMH. Presents to ED complaining chest pain, described as sharp, 8/10, localized and pounding since today. Pt had been massaging area over shirt to relieve pain and now has a 2nd degree burn on the area. Also complains of occasional night sweats. Pt has never seen a doctor or had regular blood test before. Denies nausea, vomiting, diarrhea, abdominal pain, GERD, weight loss, sick contacts or any other complaints.    Social Hx: Pt confirms drinking alcohol socially, on weekends. Last drink yesterday at a party 10 beers. Works as a . Heavy smoker 3-4 cigarets a day for the past 8 years. (20 May 2017 19:46)    __________________________  Overnight Events:  No acute overnight events  __________________________  Vital Signs Last 24 Hrs  T(C): 36.9, Max: 37.4 (05-22 @ 05:30)  T(F): 98.5, Max: 99.4 (05-22 @ 05:30)  HR: 99 (70 - 101)  BP: 129/90 (113/83 - 138/92)  BP(mean): --  RR: 15 (15 - 18)  SpO2: 97% (95% - 100%)  __________________________    MEDICATIONS  (STANDING):  sodium chloride 3%  Inhalation 3milliLiter(s) Inhalation daily  aspirin enteric coated 81milliGRAM(s) Oral daily  atorvastatin 40milliGRAM(s) Oral at bedtime  potassium acid phosphate/sodium acid phosphate tablet (K-PHOS No. 2) 1Tablet(s) Oral four times a day with meals    MEDICATIONS  (PRN):    __________________________    General: NAD  Neurology: A&Ox3  Respiratory: CTA B/L  CV: S1S2  chest- area of skin burn from constant rubbing  Abdominal: Soft, NT, ND  MSK: No edema    __________________________                          15.0   6.4   )-----------( 207      ( 21 May 2017 07:49 )             45.4     05-21    141  |  107  |  13  ----------------------------<  77  3.6   |  26  |  0.80    Ca    8.5      21 May 2017 07:49  Phos  2.2     05-21  Mg     2.3     05-21    TPro  7.5  /  Alb  3.6  /  TBili  0.8  /  DBili  x   /  AST  27  /  ALT  44  /  AlkPhos  109  05-21    __________________________  Assessment/Plan

## 2017-05-22 NOTE — CONSULT NOTE ADULT - SKIN COMMENTS
wound, pain, tender, erythema on left side of chest wound, pain, tender, discoloration of skin on left side of chest

## 2017-05-22 NOTE — PROGRESS NOTE ADULT - PROBLEM SELECTOR PLAN 1
pt was admitted due to chest pain   on CTA with B/L apical masslike consolidations with ground-glass opacities in the periphery of the prosthesis worrisome for infectious, inflammatory or neoplastic disease. No definite cavitation seen but unable to r/o TB   c/w isolation precautions until TB ruled out   PPD 2.5cm  f/u sputum Cx  blood cx neg x 2  HIV negative  CXR- bilateral upper lobe opacities  ID consulted Dr. Nogueira- states pt will need bronchoscopy  Pulmonology Dr. Barillas

## 2017-05-22 NOTE — CONSULT NOTE ADULT - SUBJECTIVE AND OBJECTIVE BOX
HPI:  35 y/o M from home. No significant PMH. Presents to ED complaining chest pain, described as sharp, 8/10, localized and pounding since today. Pt had been massaging area over shirt to relieve pain and now has a 2nd degree burn on the area. Also complains of occasional night sweats. Pt has never seen a doctor or had regular blood test before. Denies nausea, vomiting, diarrhea, abdominal pain, GERD, weight loss, sick contacts or any other complaints.    Social Hx: Pt confirms drinking alcohol socially, on weekends. Last drink yesterday at a party 10 beers. Works as a . Heavy smoker 3-4 cigarets a day for the past 8 years. (20 May 2017 19:46)      PAST MEDICAL & SURGICAL HISTORY:  No pertinent past medical history  No significant past surgical history      No Known Allergies      Meds:  sodium chloride 3%  Inhalation 3milliLiter(s) Inhalation daily  aspirin enteric coated 81milliGRAM(s) Oral daily  atorvastatin 40milliGRAM(s) Oral at bedtime  potassium acid phosphate/sodium acid phosphate tablet (K-PHOS No. 2) 1Tablet(s) Oral four times a day with meals      SOCIAL HISTORY:  Smoker:  YES / NO        PACK YEARS:                         WHEN QUIT?  ETOH use:  YES / NO               FREQUENCY / QUANTITY:  Ilicit Drug use:  YES / NO  Occupation:  Assisted device use (Cane / Walker):  Live with:    FAMILY HISTORY:  Family history of hypertension in mother (Mother)      VITALS:  Vital Signs Last 24 Hrs  T(C): 37, Max: 37.4 (05-22 @ 05:30)  T(F): 98.6, Max: 99.4 (05-22 @ 05:30)  HR: 75 (70 - 101)  BP: 118/75 (113/83 - 138/92)  BP(mean): --  RR: 15 (15 - 18)  SpO2: 98% (95% - 100%)    LABS/DIAGNOSTIC TESTS:                          15.0   6.4   )-----------( 207      ( 21 May 2017 07:49 )             45.4     WBC Count: 6.4 K/uL (05-21 @ 07:49)  WBC Count: 10.3 K/uL (05-20 @ 14:16)      05-21    141  |  107  |  13  ----------------------------<  77  3.6   |  26  |  0.80    Ca    8.5      21 May 2017 07:49  Phos  2.2     05-21  Mg     2.3     05-21    TPro  7.5  /  Alb  3.6  /  TBili  0.8  /  DBili  x   /  AST  27  /  ALT  44  /  AlkPhos  109  05-21          LIVER FUNCTIONS - ( 21 May 2017 07:49 )  Alb: 3.6 g/dL / Pro: 7.5 g/dL / ALK PHOS: 109 U/L / ALT: 44 U/L DA / AST: 27 U/L / GGT: x                 LACTATE:    ABG -     CULTURES: TESTING      RADIOLOGY: CXR Bilateral upper lobe opacities, possibly infectious, inflammatory or neoplastic. Please correlate clinically.  CTA Chest: No evidence of a pulmonary embolism. Bilateral apical masslike consolidations with groundglass opacities in the periphery of the prosthesis worrisome for infectious, inflammatory or neoplastic disease. Although there is no definite cavitation the possibility of TB cannot be entirely excluded.      ROS  [  ] UNABLE TO ELICIT HPI:  37 y/o M from home. No significant PMH. Presents to ED complaining chest pain, described as sharp, 8/10, localized and pounding since today. Pt had been massaging area over shirt to relieve pain and now has a 2nd degree burn on the area. pt denies night sweats, wt loss, coughing , hemotysis. Pt has never seen a doctor or had regular blood test before. Denies nausea, vomiting, diarrhea, abdominal pain, GERD, weight loss, sick contacts or any other complaints.    Social Hx: Pt confirms drinking alcohol socially, on weekends. Last drink yesterday at a party 10 beers. Works as a .  smoker 3-4 cigarets a day for the past 8 years. (20 May 2017 19:46)      PAST MEDICAL & SURGICAL HISTORY:  No pertinent past medical history  No significant past surgical history      No Known Allergies      Meds:  sodium chloride 3%  Inhalation 3milliLiter(s) Inhalation daily  aspirin enteric coated 81milliGRAM(s) Oral daily  atorvastatin 40milliGRAM(s) Oral at bedtime  potassium acid phosphate/sodium acid phosphate tablet (K-PHOS No. 2) 1Tablet(s) Oral four times a day with meals      SOCIAL HISTORY:  Smoker:  YES   ETOH use:  YES   Ilicit Drug use:  denies    FAMILY HISTORY:  Family history of hypertension in mother (Mother), no history of cancers in family nor is anyone in his household sick.      VITALS:  Vital Signs Last 24 Hrs  T(C): 37, Max: 37.4 (05-22 @ 05:30)  T(F): 98.6, Max: 99.4 (05-22 @ 05:30)  HR: 75 (70 - 101)  BP: 118/75 (113/83 - 138/92)  BP(mean): --  RR: 15 (15 - 18)  SpO2: 98% (95% - 100%)    LABS/DIAGNOSTIC TESTS:                          15.0   6.4   )-----------( 207      ( 21 May 2017 07:49 )             45.4     WBC Count: 6.4 K/uL (05-21 @ 07:49)  WBC Count: 10.3 K/uL (05-20 @ 14:16)      05-21    141  |  107  |  13  ----------------------------<  77  3.6   |  26  |  0.80    Ca    8.5      21 May 2017 07:49  Phos  2.2     05-21  Mg     2.3     05-21    TPro  7.5  /  Alb  3.6  /  TBili  0.8  /  DBili  x   /  AST  27  /  ALT  44  /  AlkPhos  109  05-21          LIVER FUNCTIONS - ( 21 May 2017 07:49 )  Alb: 3.6 g/dL / Pro: 7.5 g/dL / ALK PHOS: 109 U/L / ALT: 44 U/L DA / AST: 27 U/L / GGT: x           sputum afb - neg x 1      LACTATE:    ABG -     CULTURES: TESTING      RADIOLOGY: CXR Bilateral upper lobe opacities, possibly infectious, inflammatory or neoplastic. Please correlate clinically.  CTA Chest: No evidence of a pulmonary embolism. Bilateral apical masslike consolidations with groundglass opacities in the periphery of the prosthesis worrisome for infectious, inflammatory or neoplastic disease. Although there is no definite cavitation the possibility of TB cannot be entirely excluded.      ROS  [  ] UNABLE TO ELICIT

## 2017-05-22 NOTE — CONSULT NOTE ADULT - ASSESSMENT
B/L Apical Lung Consolidations B/L Apical Lung Consolidations  Second degree wound on left side of chest B/L Apical Lung Consolidations/masses - less likely to be TB  Second degree wound on left side of chest  plan - get sputum for afb x 3 -testing  no abxs for now  will need bronch and biopsy once TB ruled out.

## 2017-05-22 NOTE — CONSULT NOTE ADULT - RS GEN PE MLT RESP DETAILS PC
no rhonchi/clear to auscultation bilaterally/no rales/normal/breath sounds equal/no wheezes/chest wall tenderness

## 2017-05-23 LAB
NIGHT BLUE STAIN TISS: SIGNIFICANT CHANGE UP
NIGHT BLUE STAIN TISS: SIGNIFICANT CHANGE UP
SPECIMEN SOURCE: SIGNIFICANT CHANGE UP
SPECIMEN SOURCE: SIGNIFICANT CHANGE UP

## 2017-05-23 RX ADMIN — ATORVASTATIN CALCIUM 40 MILLIGRAM(S): 80 TABLET, FILM COATED ORAL at 21:49

## 2017-05-23 RX ADMIN — Medication 81 MILLIGRAM(S): at 12:16

## 2017-05-23 NOTE — PROGRESS NOTE ADULT - SUBJECTIVE AND OBJECTIVE BOX
Patient is a 36y old  Male who presents with a chief complaint of chest pain (20 May 2017 19:46)    HPI:  35 y/o M from home. No significant PMH. Presents to ED complaining chest pain, described as sharp, 8/10, localized and pounding since today. Pt had been massaging area over shirt to relieve pain and now has a 2nd degree burn on the area. Also complains of occasional night sweats. Pt has never seen a doctor or had regular blood test before. Denies nausea, vomiting, diarrhea, abdominal pain, GERD, weight loss, sick contacts or any other complaints.    Social Hx: Pt confirms drinking alcohol socially, on weekends. Last drink yesterday at a party 10 beers. Works as a . Heavy smoker 3-4 cigarets a day for the past 8 years. (20 May 2017 19:46)    __________________________  Overnight Events:  No acute overnight events  __________________________  Vital Signs Last 24 Hrs  T(C): 36.7, Max: 37 (05-22 @ 19:38)  T(F): 98.1, Max: 98.6 (05-22 @ 19:38)  HR: 70 (68 - 99)  BP: 126/78 (115/82 - 133/88)  BP(mean): --  RR: 19 (15 - 19)  SpO2: 96% (96% - 100%)  __________________________    MEDICATIONS  (STANDING):  sodium chloride 3%  Inhalation 3milliLiter(s) Inhalation daily  aspirin enteric coated 81milliGRAM(s) Oral daily  atorvastatin 40milliGRAM(s) Oral at bedtime    MEDICATIONS  (PRN):    __________________________    General:  NAD  Neurology: A&Ox3  Respiratory: CTA B/L  CV: S1S2  Abdominal: Soft, NT, ND  MSK:  + peripheral pulses    __________________________            __________________________  Assessment/Plan

## 2017-05-23 NOTE — PROGRESS NOTE ADULT - SUBJECTIVE AND OBJECTIVE BOX
Patient is a 36y old  Male who presents with a chief complaint of chest pain (20 May 2017 19:46)      INTERVAL HPI/OVERNIGHT EVENTS:      VITAL SIGNS:  T(F): 98.1  HR: 68  BP: 115/82  RR: 19  SpO2: 100%  Wt(kg): --  I&O's Detail    I & Os for current day (as of 23 May 2017 10:28)  =============================================  IN:    Oral Fluid: 536 ml    Total IN: 536 ml  ---------------------------------------------  OUT:    Total OUT: 0 ml  ---------------------------------------------  Total NET: 536 ml          REVIEW OF SYSTEMS:    CONSTITUTIONAL:  No fevers, chills, sweats    HEENT:  Eyes:  No diplopia or blurred vision. ENT:  No earache, sore throat or runny nose.    CARDIOVASCULAR:  No pressure, squeezing, tightness, or heaviness about the chest; no palpitations.    RESPIRATORY:  Per HPI    GASTROINTESTINAL:  No abdominal pain, nausea, vomiting or diarrhea.    GENITOURINARY:  No dysuria, frequency or urgency.    NEUROLOGIC:  No paresthesias, fasciculations, seizures or weakness.    PSYCHIATRIC:  No disorder of thought or mood.      PHYSICAL EXAM:    Constitutional: Well developed and nourished  Eyes:Perrla  ENMT: normal  Neck:supple  Respiratory: good air entry  Cardiovascular: S1 S2 regular  Gastrointestinal: Soft, Non tender  Extremities: No edema  Vascular:normal  Neurological:Awake, alert,Ox3  Musculoskeletal:Normal      MEDICATIONS  (STANDING):  sodium chloride 3%  Inhalation 3milliLiter(s) Inhalation daily  aspirin enteric coated 81milliGRAM(s) Oral daily  atorvastatin 40milliGRAM(s) Oral at bedtime    MEDICATIONS  (PRN):      Allergies    No Known Allergies    Intolerances        LABS:                        15.0   6.4   )-----------( 207      ( 21 May 2017 07:49 )             45.4   05-21    141  |  107  |  13  ----------------------------<  77  3.6   |  26  |  0.80    Ca    8.5      21 May 2017 07:49  Phos  2.2     05-21    TPro  7.5  /  Alb  3.6  /  TBili  0.8  /  DBili  x   /  AST  27  /  ALT  44  /  AlkPhos  109  05-21                    CAPILLARY BLOOD GLUCOSE        RADIOLOGY & ADDITIONAL TESTS:    CXR:  05-21    141  |  107  |  13  ----------------------------<  77  3.6   |  26  |  0.80    Ca    8.5      21 May 2017 07:49  Phos  2.2     05-21    TPro  7.5  /  Alb  3.6  /  TBili  0.8  /  DBili  x   /  AST  27  /  ALT  44  /  AlkPhos  109  05-21    Chest Xray:  IMPRESSION: Bilateral upper lobe opacities, possibly infectious,   inflammatory or neoplastic. Please correlate clinically.    Ct scan chest:  IMPRESSION:  No evidenceof a pulmonary embolism. Bilateral apical masslike   consolidations with groundglass opacities in the periphery of the   prosthesis worrisome for infectious, inflammatory or neoplastic disease.   Although there is no definite cavitation the possibility of TB cannot be   entirely excluded. Please correlate clinically.    ekg;    echo: Patient is a 36y old  Male who presents with a chief complaint of chest pain (20 May 2017 19:46)  Awake, alert, comfortable in bed in NAD. Awaiting sputum for AFB x3    INTERVAL HPI/OVERNIGHT EVENTS:      VITAL SIGNS:  T(F): 98.1  HR: 68  BP: 115/82  RR: 19  SpO2: 100%  Wt(kg): --  I&O's Detail    I & Os for current day (as of 23 May 2017 10:28)  =============================================  IN:    Oral Fluid: 536 ml    Total IN: 536 ml  ---------------------------------------------  OUT:    Total OUT: 0 ml  ---------------------------------------------  Total NET: 536 ml          REVIEW OF SYSTEMS:    CONSTITUTIONAL:  No fevers, chills, sweats    HEENT:  Eyes:  No diplopia or blurred vision. ENT:  No earache, sore throat or runny nose.    CARDIOVASCULAR:  No pressure, squeezing, tightness, or heaviness about the chest; no palpitations.    RESPIRATORY:  Per HPI    GASTROINTESTINAL:  No abdominal pain, nausea, vomiting or diarrhea.    GENITOURINARY:  No dysuria, frequency or urgency.    NEUROLOGIC:  No paresthesias, fasciculations, seizures or weakness.    PSYCHIATRIC:  No disorder of thought or mood.      PHYSICAL EXAM:    Constitutional: Well developed and nourished  Eyes:Perrla  ENMT: normal  Neck:supple  Respiratory: good air entry  Cardiovascular: S1 S2 regular  Gastrointestinal: Soft, Non tender  Extremities: No edema  Vascular:normal  Neurological:Awake, alert,Ox3  Musculoskeletal:Left upper chest vesicular and erythematous rash      MEDICATIONS  (STANDING):  sodium chloride 3%  Inhalation 3milliLiter(s) Inhalation daily  aspirin enteric coated 81milliGRAM(s) Oral daily  atorvastatin 40milliGRAM(s) Oral at bedtime    MEDICATIONS  (PRN):      Allergies    No Known Allergies    Intolerances        LABS:                        15.0   6.4   )-----------( 207      ( 21 May 2017 07:49 )             45.4   05-21    141  |  107  |  13  ----------------------------<  77  3.6   |  26  |  0.80    Ca    8.5      21 May 2017 07:49  Phos  2.2     05-21    TPro  7.5  /  Alb  3.6  /  TBili  0.8  /  DBili  x   /  AST  27  /  ALT  44  /  AlkPhos  109  05-21                    CAPILLARY BLOOD GLUCOSE            141  |  107  |  13  ----------------------------<  77  3.6   |  26  |  0.80    Ca    8.5      21 May 2017 07:49  Phos  2.2     05-21    TPro  7.5  /  Alb  3.6  /  TBili  0.8  /  DBili  x   /  AST  27  /  ALT  44  /  AlkPhos  109  05-21    Chest Xray:  IMPRESSION: Bilateral upper lobe opacities, possibly infectious,   inflammatory or neoplastic. Please correlate clinically.    Ct scan chest:  IMPRESSION:  No evidenceof a pulmonary embolism. Bilateral apical masslike   consolidations with groundglass opacities in the periphery of the   prosthesis worrisome for infectious, inflammatory or neoplastic disease.   Although there is no definite cavitation the possibility of TB cannot be   entirely excluded. Please correlate clinically.    ekg;    echo:

## 2017-05-23 NOTE — PROGRESS NOTE ADULT - ASSESSMENT
B/L Apical Consolidations  f/u AFB sputum x 2 to r/o TB B/L Apical Consolidations  f/u AFB sputum x 2 to r/o TB  plan - no abxs at this time  if AFB negative x 3 then will need a bronchoscopy

## 2017-05-23 NOTE — PROGRESS NOTE ADULT - PROBLEM SELECTOR PLAN 1
on CTA with B/L apical masslike consolidations with ground-glass opacities in the periphery of the prosthesis worrisome for infectious, inflammatory or neoplastic disease, no definite cavitation seen but unable to r/o TB   c/w isolation precautions until TB ruled out   PPD positive, may be from prior exposure  f/u sputum Cx x 3, first sputum cx negative  once TB ruled out, pt will need bronchoscopy  blood cx neg x 2  HIV negative  CXR- bilateral upper lobe opacities  ID consulted Dr. Nogueira  Pulmonology Dr. Barillas

## 2017-05-23 NOTE — PROGRESS NOTE ADULT - RS GEN PE MLT RESP DETAILS PC
no wheezes/clear to auscultation bilaterally/normal/breath sounds equal/chest wall tenderness/no rhonchi/left chest wound improving/no rales

## 2017-05-23 NOTE — PROGRESS NOTE ADULT - SUBJECTIVE AND OBJECTIVE BOX
36y Male     Meds:    Allergies    No Known Allergies    Intolerances        VITALS:  Vital Signs Last 24 Hrs  T(C): 36.7, Max: 37 (05-22 @ 19:38)  T(F): 98.1, Max: 98.6 (05-22 @ 19:38)  HR: 68 (68 - 99)  BP: 115/82 (115/82 - 133/88)  BP(mean): --  RR: 19 (15 - 19)  SpO2: 100% (97% - 100%)    LABS/DIAGNOSTIC TESTS:                      CULTURES: .Sputum Sputum  05-22 @ 00:53 --  --  --      .Blood Blood-Peripheral  05-21 @ 12:33   No growth to date.  --  --            RADIOLOGY:      ROS:  [  ] UNABLE TO ELICIT 36y Male with b/l apical consolidations. no fever, no chills, no night sweats. Chest wound improved.    Meds:    Allergies    No Known Allergies    Intolerances        VITALS:  Vital Signs Last 24 Hrs  T(C): 36.7, Max: 37 (05-22 @ 19:38)  T(F): 98.1, Max: 98.6 (05-22 @ 19:38)  HR: 68 (68 - 99)  BP: 115/82 (115/82 - 133/88)  BP(mean): --  RR: 19 (15 - 19)  SpO2: 100% (97% - 100%)    LABS/DIAGNOSTIC TESTS:                      CULTURES: .Sputum Sputum  05-22 @ 00:53 --  --  --      .Blood Blood-Peripheral  05-21 @ 12:33   No growth to date.  --  --            RADIOLOGY:      ROS:  [  ] UNABLE TO ELICIT 36y Male with b/l apical consolidationsmasses. no fever, no chills, no night sweats. Chest wound improved.    Meds:    Allergies    No Known Allergies    Intolerances        VITALS:  Vital Signs Last 24 Hrs  T(C): 36.7, Max: 37 (05-22 @ 19:38)  T(F): 98.1, Max: 98.6 (05-22 @ 19:38)  HR: 68 (68 - 99)  BP: 115/82 (115/82 - 133/88)  BP(mean): --  RR: 19 (15 - 19)  SpO2: 100% (97% - 100%)    LABS/DIAGNOSTIC TESTS:                      CULTURES: .Sputum Sputum  05-22 @ 00:53 --  --  --      .Blood Blood-Peripheral  05-21 @ 12:33   No growth to date.  --  --            RADIOLOGY:      ROS:  [  ] UNABLE TO ELICIT

## 2017-05-24 LAB
ALBUMIN SERPL ELPH-MCNC: 3.3 G/DL — LOW (ref 3.5–5)
ALP SERPL-CCNC: 103 U/L — SIGNIFICANT CHANGE UP (ref 40–120)
ALT FLD-CCNC: 44 U/L DA — SIGNIFICANT CHANGE UP (ref 10–60)
ANION GAP SERPL CALC-SCNC: 3 MMOL/L — LOW (ref 5–17)
AST SERPL-CCNC: 26 U/L — SIGNIFICANT CHANGE UP (ref 10–40)
BASOPHILS # BLD AUTO: 0.1 K/UL — SIGNIFICANT CHANGE UP (ref 0–0.2)
BASOPHILS NFR BLD AUTO: 0.9 % — SIGNIFICANT CHANGE UP (ref 0–2)
BILIRUB SERPL-MCNC: 0.5 MG/DL — SIGNIFICANT CHANGE UP (ref 0.2–1.2)
BUN SERPL-MCNC: 16 MG/DL — SIGNIFICANT CHANGE UP (ref 7–18)
CALCIUM SERPL-MCNC: 8.8 MG/DL — SIGNIFICANT CHANGE UP (ref 8.4–10.5)
CHLORIDE SERPL-SCNC: 106 MMOL/L — SIGNIFICANT CHANGE UP (ref 96–108)
CO2 SERPL-SCNC: 29 MMOL/L — SIGNIFICANT CHANGE UP (ref 22–31)
CREAT SERPL-MCNC: 0.86 MG/DL — SIGNIFICANT CHANGE UP (ref 0.5–1.3)
EOSINOPHIL # BLD AUTO: 0.9 K/UL — HIGH (ref 0–0.5)
EOSINOPHIL NFR BLD AUTO: 15.2 % — HIGH (ref 0–6)
GLUCOSE SERPL-MCNC: 86 MG/DL — SIGNIFICANT CHANGE UP (ref 70–99)
HCT VFR BLD CALC: 42.7 % — SIGNIFICANT CHANGE UP (ref 39–50)
HGB BLD-MCNC: 14.1 G/DL — SIGNIFICANT CHANGE UP (ref 13–17)
LYMPHOCYTES # BLD AUTO: 1.4 K/UL — SIGNIFICANT CHANGE UP (ref 1–3.3)
LYMPHOCYTES # BLD AUTO: 23.4 % — SIGNIFICANT CHANGE UP (ref 13–44)
M TB TUBERC IFN-G BLD QL: 0.08 IU/ML — SIGNIFICANT CHANGE UP
M TB TUBERC IFN-G BLD QL: >10 IU/ML — SIGNIFICANT CHANGE UP
M TB TUBERC IFN-G BLD QL: POSITIVE
MAGNESIUM SERPL-MCNC: 2.2 MG/DL — SIGNIFICANT CHANGE UP (ref 1.6–2.6)
MCHC RBC-ENTMCNC: 32.3 PG — SIGNIFICANT CHANGE UP (ref 27–34)
MCHC RBC-ENTMCNC: 33.1 GM/DL — SIGNIFICANT CHANGE UP (ref 32–36)
MCV RBC AUTO: 97.5 FL — SIGNIFICANT CHANGE UP (ref 80–100)
MITOGEN IGNF BCKGRD COR BLD-ACNC: >10 IU/ML — SIGNIFICANT CHANGE UP
MONOCYTES # BLD AUTO: 0.6 K/UL — SIGNIFICANT CHANGE UP (ref 0–0.9)
MONOCYTES NFR BLD AUTO: 9.9 % — SIGNIFICANT CHANGE UP (ref 2–14)
NEUTROPHILS # BLD AUTO: 3.1 K/UL — SIGNIFICANT CHANGE UP (ref 1.8–7.4)
NEUTROPHILS NFR BLD AUTO: 50.7 % — SIGNIFICANT CHANGE UP (ref 43–77)
PHOSPHATE SERPL-MCNC: 3 MG/DL — SIGNIFICANT CHANGE UP (ref 2.5–4.5)
PLATELET # BLD AUTO: 209 K/UL — SIGNIFICANT CHANGE UP (ref 150–400)
POTASSIUM SERPL-MCNC: 4 MMOL/L — SIGNIFICANT CHANGE UP (ref 3.5–5.3)
POTASSIUM SERPL-SCNC: 4 MMOL/L — SIGNIFICANT CHANGE UP (ref 3.5–5.3)
PROT SERPL-MCNC: 7.3 G/DL — SIGNIFICANT CHANGE UP (ref 6–8.3)
RBC # BLD: 4.38 M/UL — SIGNIFICANT CHANGE UP (ref 4.2–5.8)
RBC # FLD: 12.5 % — SIGNIFICANT CHANGE UP (ref 10.3–14.5)
SODIUM SERPL-SCNC: 138 MMOL/L — SIGNIFICANT CHANGE UP (ref 135–145)
WBC # BLD: 6.2 K/UL — SIGNIFICANT CHANGE UP (ref 3.8–10.5)
WBC # FLD AUTO: 6.2 K/UL — SIGNIFICANT CHANGE UP (ref 3.8–10.5)

## 2017-05-24 RX ORDER — ACETAMINOPHEN 500 MG
650 TABLET ORAL ONCE
Qty: 0 | Refills: 0 | Status: COMPLETED | OUTPATIENT
Start: 2017-05-24 | End: 2017-05-24

## 2017-05-24 RX ADMIN — Medication 81 MILLIGRAM(S): at 12:07

## 2017-05-24 RX ADMIN — Medication 650 MILLIGRAM(S): at 23:07

## 2017-05-24 RX ADMIN — Medication 650 MILLIGRAM(S): at 22:31

## 2017-05-24 RX ADMIN — SODIUM CHLORIDE 3 MILLILITER(S): 9 INJECTION INTRAMUSCULAR; INTRAVENOUS; SUBCUTANEOUS at 05:22

## 2017-05-24 RX ADMIN — ATORVASTATIN CALCIUM 40 MILLIGRAM(S): 80 TABLET, FILM COATED ORAL at 21:21

## 2017-05-24 NOTE — PROGRESS NOTE ADULT - PROBLEM SELECTOR PLAN 1
on CTA with B/L apical masslike consolidations with ground-glass opacities in the periphery of the prosthesis worrisome for infectious, inflammatory or neoplastic disease, no definite cavitation seen but unable to r/o TB   PPD positive, may be from prior exposure  AFB sputum Cx neg x 3  TB ruled out, per ID can d/c isolation  bronchoscopy scheduled for 10:30 AM on friday  blood cx neg x 2  HIV negative  CXR- bilateral upper lobe opacities  ID consulted Dr. Nogueira  Pulmonology Dr. Barillas on CTA with B/L apical masslike consolidations with ground-glass opacities in the periphery of the prosthesis worrisome for infectious, inflammatory or neoplastic disease, no definite cavitation seen but unable to r/o TB   PPD positive, quantiferon gold positive, may be from prior exposure  AFB sputum Cx neg x 3  TB ruled out, per ID can d/c isolation  bronchoscopy scheduled for 10:30 AM on friday  blood cx neg x 2  HIV negative  CXR- bilateral upper lobe opacities  ID consulted Dr. Nogueira  Pulmonology Dr. Barillas

## 2017-05-24 NOTE — PROGRESS NOTE ADULT - SUBJECTIVE AND OBJECTIVE BOX
Patient is a 36y old  Male who presents with a chief complaint of chest pain (20 May 2017 19:46)    HPI:  37 y/o M from home. No significant PMH. Presents to ED complaining chest pain, described as sharp, 8/10, localized and pounding since today. Pt had been massaging area over shirt to relieve pain and now has a 2nd degree burn on the area. Also complains of occasional night sweats. Pt has never seen a doctor or had regular blood test before. Denies nausea, vomiting, diarrhea, abdominal pain, GERD, weight loss, sick contacts or any other complaints.    Social Hx: Pt confirms drinking alcohol socially, on weekends. Last drink yesterday at a party 10 beers. Works as a . Heavy smoker 3-4 cigarets a day for the past 8 years. (20 May 2017 19:46)    __________________________  Overnight Events:    No acute overnight events  __________________________  Vital Signs Last 24 Hrs  T(C): 37.1, Max: 37.3 (05-24 @ 08:24)  T(F): 98.7, Max: 99.1 (05-24 @ 08:24)  HR: 74 (60 - 74)  BP: 121/84 (117/75 - 123/90)  BP(mean): --  RR: 18 (14 - 19)  SpO2: 99% (97% - 100%)  __________________________    MEDICATIONS  (STANDING):  sodium chloride 3%  Inhalation 3milliLiter(s) Inhalation daily  aspirin enteric coated 81milliGRAM(s) Oral daily  atorvastatin 40milliGRAM(s) Oral at bedtime    MEDICATIONS  (PRN):    __________________________    Physical Exam  -------------------------------------------  General: NAD  Neurology: A&Ox3  Respiratory: CTA B/L  CV: S1S2  Abdominal: Soft, NT, ND  MSK: No edema, + peripheral pulses  __________________________                          14.1   6.2   )-----------( 209      ( 24 May 2017 06:51 )             42.7     05-24    138  |  106  |  16  ----------------------------<  86  4.0   |  29  |  0.86    Ca    8.8      24 May 2017 06:51  Phos  3.0     05-24  Mg     2.2     05-24    TPro  7.3  /  Alb  3.3<L>  /  TBili  0.5  /  DBili  x   /  AST  26  /  ALT  44  /  AlkPhos  103  05-24    __________________________  Assessment/Plan  -=-=-=-=-=-=-=-=-=-=-=-=-=-=-=-=-

## 2017-05-24 NOTE — PROGRESS NOTE ADULT - SUBJECTIVE AND OBJECTIVE BOX
Patient is a 36y old  Male who presents with a chief complaint of chest pain (20 May 2017 19:46)      INTERVAL HPI/OVERNIGHT EVENTS:      VITAL SIGNS:  T(F): 99.1  HR: 64  BP: 117/75  RR: 14  SpO2: 100%  Wt(kg): --  I&O's Detail    I & Os for current day (as of 24 May 2017 10:05)  =============================================  IN:    Oral Fluid: 375 ml    Total IN: 375 ml  ---------------------------------------------  OUT:    Voided: 650 ml    Total OUT: 650 ml  ---------------------------------------------  Total NET: -275 ml          REVIEW OF SYSTEMS:    CONSTITUTIONAL:  No fevers, chills, sweats    HEENT:  Eyes:  No diplopia or blurred vision. ENT:  No earache, sore throat or runny nose.    CARDIOVASCULAR:  No pressure, squeezing, tightness, or heaviness about the chest; no palpitations.    RESPIRATORY:  Per HPI    GASTROINTESTINAL:  No abdominal pain, nausea, vomiting or diarrhea.    GENITOURINARY:  No dysuria, frequency or urgency.    NEUROLOGIC:  No paresthesias, fasciculations, seizures or weakness.    PSYCHIATRIC:  No disorder of thought or mood.      PHYSICAL EXAM:    Constitutional: Well developed and nourished  Eyes:Perrla  ENMT: normal  Neck:supple  Respiratory: good air entry  Cardiovascular: S1 S2 regular  Gastrointestinal: Soft, Non tender  Extremities: No edema  Vascular:normal  Neurological:Awake, alert,Ox3  Musculoskeletal:Normal      MEDICATIONS  (STANDING):  sodium chloride 3%  Inhalation 3milliLiter(s) Inhalation daily  aspirin enteric coated 81milliGRAM(s) Oral daily  atorvastatin 40milliGRAM(s) Oral at bedtime    MEDICATIONS  (PRN):      Allergies    No Known Allergies    Intolerances        LABS:                        14.1   6.2   )-----------( 209      ( 24 May 2017 06:51 )             42.7     05-24    138  |  106  |  16  ----------------------------<  86  4.0   |  29  |  0.86    Ca    8.8      24 May 2017 06:51  Phos  3.0     05-24  Mg     2.2     05-24    TPro  7.3  /  Alb  3.3<L>  /  TBili  0.5  /  DBili  x   /  AST  26  /  ALT  44  /  AlkPhos  103  05-24              CAPILLARY BLOOD GLUCOSE        RADIOLOGY & ADDITIONAL TESTS:    CXR:  IMPRESSION: Bilateral upper lobe opacities, possibly infectious,   inflammatory or neoplastic. Please correlate clinically.      Ct scan chest:  IMPRESSION:  No evidenceof a pulmonary embolism. Bilateral apical masslike   consolidations with groundglass opacities in the periphery of the   prosthesis worrisome for infectious, inflammatory or neoplastic disease.   Although there is no definite cavitation the possibility of TB cannot be   entirely excluded. Please correlate clinically.     Culture - Acid Fast - Sputum w/Smear . (05.23.17 @ 09:33)   Acid Fast Bacilli Smear:   No acid fast bacilli seen by fluorochrome stain        ekg;    echo: Patient is a 36y old  Male who presents with a chief complaint of chest pain (20 May 2017 19:46)  Awake, alert, comfortable in bed in NAD. Sputum for AFB negative x 3.    INTERVAL HPI/OVERNIGHT EVENTS:      VITAL SIGNS:  T(F): 99.1  HR: 64  BP: 117/75  RR: 14  SpO2: 100%  Wt(kg): --  I&O's Detail    I & Os for current day (as of 24 May 2017 10:05)  =============================================  IN:    Oral Fluid: 375 ml    Total IN: 375 ml  ---------------------------------------------  OUT:    Voided: 650 ml    Total OUT: 650 ml  ---------------------------------------------  Total NET: -275 ml          REVIEW OF SYSTEMS:    CONSTITUTIONAL:  No fevers, chills, sweats    HEENT:  Eyes:  No diplopia or blurred vision. ENT:  No earache, sore throat or runny nose.    CARDIOVASCULAR:  No pressure, squeezing, tightness, or heaviness about the chest; no palpitations.    RESPIRATORY:  Per HPI    GASTROINTESTINAL:  No abdominal pain, nausea, vomiting or diarrhea.    GENITOURINARY:  No dysuria, frequency or urgency.    NEUROLOGIC:  No paresthesias, fasciculations, seizures or weakness.    PSYCHIATRIC:  No disorder of thought or mood.      PHYSICAL EXAM:    Constitutional: Well developed and nourished  Eyes:Perrla  ENMT: normal  Neck:supple  Respiratory: good air entry  Cardiovascular: S1 S2 regular  Gastrointestinal: Soft, Non tender  Extremities: No edema  Vascular:normal  Neurological:Awake, alert,Ox3  Musculoskeletal: dried and less erythematous rash on left upper chest      MEDICATIONS  (STANDING):  sodium chloride 3%  Inhalation 3milliLiter(s) Inhalation daily  aspirin enteric coated 81milliGRAM(s) Oral daily  atorvastatin 40milliGRAM(s) Oral at bedtime    MEDICATIONS  (PRN):      Allergies    No Known Allergies    Intolerances        LABS:                        14.1   6.2   )-----------( 209      ( 24 May 2017 06:51 )             42.7     05-24    138  |  106  |  16  ----------------------------<  86  4.0   |  29  |  0.86    Ca    8.8      24 May 2017 06:51  Phos  3.0     05-24  Mg     2.2     05-24    TPro  7.3  /  Alb  3.3<L>  /  TBili  0.5  /  DBili  x   /  AST  26  /  ALT  44  /  AlkPhos  103  05-24              CAPILLARY BLOOD GLUCOSE        RADIOLOGY & ADDITIONAL TESTS:    CXR:  IMPRESSION: Bilateral upper lobe opacities, possibly infectious,   inflammatory or neoplastic. Please correlate clinically.      Ct scan chest:  IMPRESSION:  No evidenceof a pulmonary embolism. Bilateral apical masslike   consolidations with groundglass opacities in the periphery of the   prosthesis worrisome for infectious, inflammatory or neoplastic disease.   Although there is no definite cavitation the possibility of TB cannot be   entirely excluded. Please correlate clinically.     Culture - Acid Fast - Sputum w/Smear . (05.23.17 @ 09:33)   Acid Fast Bacilli Smear:   No acid fast bacilli seen by fluorochrome stain        ekg;    echo:

## 2017-05-25 LAB
CK MB BLD-MCNC: <1.9 % — SIGNIFICANT CHANGE UP (ref 0–3.5)
CK MB CFR SERPL CALC: <1 NG/ML — SIGNIFICANT CHANGE UP (ref 0–3.6)
CK SERPL-CCNC: 54 U/L — SIGNIFICANT CHANGE UP (ref 35–232)
TROPONIN I SERPL-MCNC: <0.015 NG/ML — SIGNIFICANT CHANGE UP (ref 0–0.04)

## 2017-05-25 RX ORDER — METOPROLOL TARTRATE 50 MG
25 TABLET ORAL
Qty: 0 | Refills: 0 | Status: COMPLETED | OUTPATIENT
Start: 2017-05-25 | End: 2017-05-26

## 2017-05-25 RX ADMIN — Medication 81 MILLIGRAM(S): at 11:50

## 2017-05-25 RX ADMIN — ATORVASTATIN CALCIUM 40 MILLIGRAM(S): 80 TABLET, FILM COATED ORAL at 22:26

## 2017-05-25 RX ADMIN — Medication 25 MILLIGRAM(S): at 19:14

## 2017-05-25 NOTE — PROGRESS NOTE ADULT - ASSESSMENT
pulmonary lesions - sputum for AFB -neg x 3 ,off isolation. no fevers or leukocytosis,no signs of infection  no need for abxs at this time.  reconsult prn

## 2017-05-25 NOTE — CHART NOTE - NSCHARTNOTEFT_GEN_A_CORE
RN told me pt c/o CP  Pt endorses squeezing type pain over his chest, radiating down his Left arm. a/w SOB,  Pt has zozter-looking rash over chest, per previous note this is a rub-burn as pt excessively rubbing chest as he says this helps his SOB.  V/S showing BP 150s/111 - will add lopressor 25mg PO BID x 3 doses - primary team to continue or DC as clinically indicated  -will restart tele  -will obtain EKG and troponin levels RN told me pt c/o CP  Pt endorses squeezing type pain over his chest, radiating down his Left arm. a/w SOB,  Pt has zozter-looking rash over chest, per previous note this is a rub-burn as pt excessively rubbing chest as he says this helps his SOB.  V/S showing BP 150s/111 - will add lopressor 25mg PO BID x 3 doses - primary team to continue or DC as clinically indicated  -will restart tele  -will obtain EKG and troponin levels  -spoke with Dr. Nogueira, he does not believe this is herpes zoster and says it is looking better than previously.

## 2017-05-25 NOTE — PROGRESS NOTE ADULT - PROBLEM SELECTOR PLAN 1
on CTA with B/L apical masslike consolidations with ground-glass opacities in the periphery of the prosthesis worrisome for infectious, inflammatory or neoplastic disease, no definite cavitation seen but unable to r/o TB   PPD positive, quantiferon gold positive, may be from prior exposure  AFB sputum Cx neg x 3  TB ruled out, per ID can d/c isolation  concern for possible lymphoma  bronchoscopy scheduled for 10:30 AM on friday  blood cx neg x 2  HIV negative  CXR- bilateral upper lobe opacities  ID consulted Dr. Nogueira  Pulmonology Dr. Barillas

## 2017-05-25 NOTE — PROGRESS NOTE ADULT - SUBJECTIVE AND OBJECTIVE BOX
Patient is a 36y old  Male who presents with a chief complaint of chest pain (20 May 2017 19:46)      INTERVAL HPI/OVERNIGHT EVENTS:      VITAL SIGNS:  T(F): 97.7  HR: 67  BP: 122/86  RR: 17  SpO2: 100%  Wt(kg): --  I&O's Detail    I & Os for current day (as of 25 May 2017 10:14)  =============================================  IN:    Oral Fluid: 436 ml    Total IN: 436 ml  ---------------------------------------------  OUT:    Voided: 750 ml    Total OUT: 750 ml  ---------------------------------------------  Total NET: -314 ml          REVIEW OF SYSTEMS:    CONSTITUTIONAL:  No fevers, chills, sweats    HEENT:  Eyes:  No diplopia or blurred vision. ENT:  No earache, sore throat or runny nose.    CARDIOVASCULAR:  No pressure, squeezing, tightness, or heaviness about the chest; no palpitations.    RESPIRATORY:  Per HPI    GASTROINTESTINAL:  No abdominal pain, nausea, vomiting or diarrhea.    GENITOURINARY:  No dysuria, frequency or urgency.    NEUROLOGIC:  No paresthesias, fasciculations, seizures or weakness.    PSYCHIATRIC:  No disorder of thought or mood.      PHYSICAL EXAM:    Constitutional: Well developed and nourished  Eyes:Perrla  ENMT: normal  Neck:supple  Respiratory: good air entry  Cardiovascular: S1 S2 regular  Gastrointestinal: Soft, Non tender  Extremities: No edema  Vascular:normal  Neurological:Awake, alert,Ox3  Musculoskeletal:Normal      MEDICATIONS  (STANDING):  sodium chloride 3%  Inhalation 3milliLiter(s) Inhalation daily  aspirin enteric coated 81milliGRAM(s) Oral daily  atorvastatin 40milliGRAM(s) Oral at bedtime    MEDICATIONS  (PRN):      Allergies    No Known Allergies    Intolerances        LABS:                        14.1   6.2   )-----------( 209      ( 24 May 2017 06:51 )             42.7     05-24    138  |  106  |  16  ----------------------------<  86  4.0   |  29  |  0.86    Ca    8.8      24 May 2017 06:51  Phos  3.0     05-24  Mg     2.2     05-24    TPro  7.3  /  Alb  3.3<L>  /  TBili  0.5  /  DBili  x   /  AST  26  /  ALT  44  /  AlkPhos  103  05-24              CAPILLARY BLOOD GLUCOSE        RADIOLOGY & ADDITIONAL TESTS:    CXR: 05/20/2017   IMPRESSION: Bilateral upper lobe opacities, possibly infectious,   inflammatory or neoplastic. Please correlate clinically.    Ct scan chest: 05/20/2017   IMPRESSION:  No evidenceof a pulmonary embolism. Bilateral apical masslike   consolidations with groundglass opacities in the periphery of the   prosthesis worrisome for infectious, inflammatory or neoplastic disease.   Although there is no definite cavitation the possibility of TB cannot be   entirely excluded. Please correlate clinically.    ekg;normal    echo:    Culture - Acid Fast - Sputum w/Smear . (05.23.17 @ 09:33)   Acid Fast Bacilli Smear:   No acid fast bacilli seen by fluorochrome stain Patient is a 36y old  Male who presents with a chief complaint of chest pain (20 May 2017 19:46). Awake, alert, comfortable in bed in NAD.      INTERVAL HPI/OVERNIGHT EVENTS:      VITAL SIGNS:  T(F): 97.7  HR: 67  BP: 122/86  RR: 17  SpO2: 100%  Wt(kg): --  I&O's Detail    I & Os for current day (as of 25 May 2017 10:14)  =============================================  IN:    Oral Fluid: 436 ml    Total IN: 436 ml  ---------------------------------------------  OUT:    Voided: 750 ml    Total OUT: 750 ml  ---------------------------------------------  Total NET: -314 ml          REVIEW OF SYSTEMS:    CONSTITUTIONAL:  No fevers, chills, sweats    HEENT:  Eyes:  No diplopia or blurred vision. ENT:  No earache, sore throat or runny nose.    CARDIOVASCULAR:  No pressure, squeezing, tightness, or heaviness about the chest; no palpitations.    RESPIRATORY:  Per HPI    GASTROINTESTINAL:  No abdominal pain, nausea, vomiting or diarrhea.    GENITOURINARY:  No dysuria, frequency or urgency.    NEUROLOGIC:  No paresthesias, fasciculations, seizures or weakness.    PSYCHIATRIC:  No disorder of thought or mood.      PHYSICAL EXAM:    Constitutional: Well developed and nourished  Eyes:Perrla  ENMT: normal  Neck:supple  Respiratory: good air entry  Cardiovascular: S1 S2 regular  Gastrointestinal: Soft, Non tender  Extremities: No edema  Vascular:normal  Neurological:Awake, alert,Ox3  Musculoskeletal: Dry erythematous and vesicular rash on left upper chest      MEDICATIONS  (STANDING):  sodium chloride 3%  Inhalation 3milliLiter(s) Inhalation daily  aspirin enteric coated 81milliGRAM(s) Oral daily  atorvastatin 40milliGRAM(s) Oral at bedtime    MEDICATIONS  (PRN):      Allergies    No Known Allergies    Intolerances        LABS:                        14.1   6.2   )-----------( 209      ( 24 May 2017 06:51 )             42.7     05-24    138  |  106  |  16  ----------------------------<  86  4.0   |  29  |  0.86    Ca    8.8      24 May 2017 06:51  Phos  3.0     05-24  Mg     2.2     05-24    TPro  7.3  /  Alb  3.3<L>  /  TBili  0.5  /  DBili  x   /  AST  26  /  ALT  44  /  AlkPhos  103  05-24              CAPILLARY BLOOD GLUCOSE        RADIOLOGY & ADDITIONAL TESTS:    CXR: 05/20/2017   IMPRESSION: Bilateral upper lobe opacities, possibly infectious,   inflammatory or neoplastic. Please correlate clinically.    Ct scan chest: 05/20/2017   IMPRESSION:  No evidenceof a pulmonary embolism. Bilateral apical masslike   consolidations with groundglass opacities in the periphery of the   prosthesis worrisome for infectious, inflammatory or neoplastic disease.   Although there is no definite cavitation the possibility of TB cannot be   entirely excluded. Please correlate clinically.    ekg;normal    echo:    Culture - Acid Fast - Sputum w/Smear . (05.23.17 @ 09:33)   Acid Fast Bacilli Smear:   No acid fast bacilli seen by fluorochrome stain

## 2017-05-25 NOTE — PROGRESS NOTE ADULT - SUBJECTIVE AND OBJECTIVE BOX
Patient is a 36y old  Male who presents with a chief complaint of chest pain (20 May 2017 19:46)    HPI:  37 y/o M from home. No significant PMH. Presents to ED complaining chest pain, described as sharp, 8/10, localized and pounding since today. Pt had been massaging area over shirt to relieve pain and now has a 2nd degree burn on the area    __________________________  Overnight Events:  No acute overnight events  __________________________  Vital Signs Last 24 Hrs  T(C): 36.6, Max: 37.3 (05-24 @ 08:24)  T(F): 97.8, Max: 99.1 (05-24 @ 08:24)  HR: 70 (64 - 79)  BP: 120/84 (117/75 - 129/89)  BP(mean): --  RR: 18 (14 - 19)  SpO2: 98% (98% - 100%)  __________________________    MEDICATIONS  (STANDING):  sodium chloride 3%  Inhalation 3milliLiter(s) Inhalation daily  aspirin enteric coated 81milliGRAM(s) Oral daily  atorvastatin 40milliGRAM(s) Oral at bedtime    MEDICATIONS  (PRN):    __________________________    Physical Exam  -------------------------------------------  General: NAD  Neurology: A&Ox3  Respiratory: CTA B/L  CV: S1S2  Abdominal: Soft, NT, ND  MSK: No edema, + peripheral pulses  __________________________                          14.1   6.2   )-----------( 209      ( 24 May 2017 06:51 )             42.7     05-24    138  |  106  |  16  ----------------------------<  86  4.0   |  29  |  0.86    Ca    8.8      24 May 2017 06:51  Phos  3.0     05-24  Mg     2.2     05-24    TPro  7.3  /  Alb  3.3<L>  /  TBili  0.5  /  DBili  x   /  AST  26  /  ALT  44  /  AlkPhos  103  05-24    __________________________  Assessment/Plan  -=-=-=-=-=-=-=-=-=-=-=-=-=-=-=-=-

## 2017-05-25 NOTE — PROGRESS NOTE ADULT - SUBJECTIVE AND OBJECTIVE BOX
36y Male    Meds:    Allergies    No Known Allergies    Intolerances        VITALS:  Vital Signs Last 24 Hrs  T(C): 36.6, Max: 36.6 (05-24 @ 19:42)  T(F): 97.8, Max: 97.9 (05-24 @ 19:42)  HR: 78 (67 - 79)  BP: 113/86 (113/86 - 129/89)  BP(mean): --  RR: 18 (17 - 19)  SpO2: 100% (98% - 100%)    LABS/DIAGNOSTIC TESTS:                          14.1   6.2   )-----------( 209      ( 24 May 2017 06:51 )             42.7         05-24    138  |  106  |  16  ----------------------------<  86  4.0   |  29  |  0.86    Ca    8.8      24 May 2017 06:51  Phos  3.0     05-24  Mg     2.2     05-24    TPro  7.3  /  Alb  3.3<L>  /  TBili  0.5  /  DBili  x   /  AST  26  /  ALT  44  /  AlkPhos  103  05-24      LIVER FUNCTIONS - ( 24 May 2017 06:51 )  Alb: 3.3 g/dL / Pro: 7.3 g/dL / ALK PHOS: 103 U/L / ALT: 44 U/L DA / AST: 26 U/L / GGT: x             CULTURES: .Sputum Sputum  05-23 @ 09:33 --  --  --      .Sputum Sputum  05-22 @ 10:34 --  --  --      .Sputum Sputum  05-22 @ 00:53 --  --  --      .Blood Blood-Peripheral  05-21 @ 12:33   No growth to date.  --  --            RADIOLOGY:      ROS:  [  ] UNABLE TO ELICIT 36y Male who c/o left sided chest pain only,no cough ,no sob , no fevers or chills. pt going for bronch tomorrow.    Meds:    Allergies    No Known Allergies    Intolerances        VITALS:  Vital Signs Last 24 Hrs  T(C): 36.6, Max: 36.6 (05-24 @ 19:42)  T(F): 97.8, Max: 97.9 (05-24 @ 19:42)  HR: 78 (67 - 79)  BP: 113/86 (113/86 - 129/89)  BP(mean): --  RR: 18 (17 - 19)  SpO2: 100% (98% - 100%)    LABS/DIAGNOSTIC TESTS:                          14.1   6.2   )-----------( 209      ( 24 May 2017 06:51 )             42.7         05-24    138  |  106  |  16  ----------------------------<  86  4.0   |  29  |  0.86    Ca    8.8      24 May 2017 06:51  Phos  3.0     05-24  Mg     2.2     05-24    TPro  7.3  /  Alb  3.3<L>  /  TBili  0.5  /  DBili  x   /  AST  26  /  ALT  44  /  AlkPhos  103  05-24      LIVER FUNCTIONS - ( 24 May 2017 06:51 )  Alb: 3.3 g/dL / Pro: 7.3 g/dL / ALK PHOS: 103 U/L / ALT: 44 U/L DA / AST: 26 U/L / GGT: x             CULTURES: .Sputum Sputum  05-23 @ 09:33 --  --  --      .Sputum Sputum  05-22 @ 10:34 --  --  --      .Sputum Sputum  05-22 @ 00:53 --  --  --      .Blood Blood-Peripheral  05-21 @ 12:33   No growth to date.  --  --            RADIOLOGY:      ROS:  [  ] UNABLE TO ELICIT

## 2017-05-26 LAB
ANION GAP SERPL CALC-SCNC: 6 MMOL/L — SIGNIFICANT CHANGE UP (ref 5–17)
APTT BLD: 31.4 SEC — SIGNIFICANT CHANGE UP (ref 27.5–37.4)
BASOPHILS # BLD AUTO: 0.1 K/UL — SIGNIFICANT CHANGE UP (ref 0–0.2)
BASOPHILS NFR BLD AUTO: 0.7 % — SIGNIFICANT CHANGE UP (ref 0–2)
BUN SERPL-MCNC: 14 MG/DL — SIGNIFICANT CHANGE UP (ref 7–18)
CALCIUM SERPL-MCNC: 9.3 MG/DL — SIGNIFICANT CHANGE UP (ref 8.4–10.5)
CHLORIDE SERPL-SCNC: 106 MMOL/L — SIGNIFICANT CHANGE UP (ref 96–108)
CO2 SERPL-SCNC: 30 MMOL/L — SIGNIFICANT CHANGE UP (ref 22–31)
CREAT SERPL-MCNC: 1 MG/DL — SIGNIFICANT CHANGE UP (ref 0.5–1.3)
CULTURE RESULTS: SIGNIFICANT CHANGE UP
CULTURE RESULTS: SIGNIFICANT CHANGE UP
EOSINOPHIL # BLD AUTO: 0.8 K/UL — HIGH (ref 0–0.5)
EOSINOPHIL NFR BLD AUTO: 11 % — HIGH (ref 0–6)
GLUCOSE SERPL-MCNC: 78 MG/DL — SIGNIFICANT CHANGE UP (ref 70–99)
HCT VFR BLD CALC: 47.4 % — SIGNIFICANT CHANGE UP (ref 39–50)
HGB BLD-MCNC: 15.2 G/DL — SIGNIFICANT CHANGE UP (ref 13–17)
INR BLD: 1.01 RATIO — SIGNIFICANT CHANGE UP (ref 0.88–1.16)
LYMPHOCYTES # BLD AUTO: 1.6 K/UL — SIGNIFICANT CHANGE UP (ref 1–3.3)
LYMPHOCYTES # BLD AUTO: 22.1 % — SIGNIFICANT CHANGE UP (ref 13–44)
MCHC RBC-ENTMCNC: 31.5 PG — SIGNIFICANT CHANGE UP (ref 27–34)
MCHC RBC-ENTMCNC: 32 GM/DL — SIGNIFICANT CHANGE UP (ref 32–36)
MCV RBC AUTO: 98.4 FL — SIGNIFICANT CHANGE UP (ref 80–100)
MONOCYTES # BLD AUTO: 0.6 K/UL — SIGNIFICANT CHANGE UP (ref 0–0.9)
MONOCYTES NFR BLD AUTO: 7.6 % — SIGNIFICANT CHANGE UP (ref 2–14)
NEUTROPHILS # BLD AUTO: 4.3 K/UL — SIGNIFICANT CHANGE UP (ref 1.8–7.4)
NEUTROPHILS NFR BLD AUTO: 58.6 % — SIGNIFICANT CHANGE UP (ref 43–77)
PLATELET # BLD AUTO: 219 K/UL — SIGNIFICANT CHANGE UP (ref 150–400)
POTASSIUM SERPL-MCNC: 4.5 MMOL/L — SIGNIFICANT CHANGE UP (ref 3.5–5.3)
POTASSIUM SERPL-SCNC: 4.5 MMOL/L — SIGNIFICANT CHANGE UP (ref 3.5–5.3)
PROTHROM AB SERPL-ACNC: 11 SEC — SIGNIFICANT CHANGE UP (ref 9.8–12.7)
RBC # BLD: 4.82 M/UL — SIGNIFICANT CHANGE UP (ref 4.2–5.8)
RBC # FLD: 12.2 % — SIGNIFICANT CHANGE UP (ref 10.3–14.5)
SODIUM SERPL-SCNC: 142 MMOL/L — SIGNIFICANT CHANGE UP (ref 135–145)
SPECIMEN SOURCE: SIGNIFICANT CHANGE UP
SPECIMEN SOURCE: SIGNIFICANT CHANGE UP
WBC # BLD: 7.4 K/UL — SIGNIFICANT CHANGE UP (ref 3.8–10.5)
WBC # FLD AUTO: 7.4 K/UL — SIGNIFICANT CHANGE UP (ref 3.8–10.5)

## 2017-05-26 PROCEDURE — 71010: CPT | Mod: 26

## 2017-05-26 PROCEDURE — 88112 CYTOPATH CELL ENHANCE TECH: CPT | Mod: 26

## 2017-05-26 PROCEDURE — 88305 TISSUE EXAM BY PATHOLOGIST: CPT | Mod: 26

## 2017-05-26 RX ORDER — ACETAMINOPHEN 500 MG
650 TABLET ORAL EVERY 6 HOURS
Qty: 0 | Refills: 0 | Status: DISCONTINUED | OUTPATIENT
Start: 2017-05-26 | End: 2017-05-29

## 2017-05-26 RX ADMIN — ATORVASTATIN CALCIUM 40 MILLIGRAM(S): 80 TABLET, FILM COATED ORAL at 21:07

## 2017-05-26 RX ADMIN — Medication 81 MILLIGRAM(S): at 17:44

## 2017-05-26 RX ADMIN — Medication 650 MILLIGRAM(S): at 19:05

## 2017-05-26 RX ADMIN — Medication 650 MILLIGRAM(S): at 17:50

## 2017-05-26 NOTE — PROGRESS NOTE ADULT - SUBJECTIVE AND OBJECTIVE BOX
Patient is a 36y old  Male who presents with a chief complaint of chest pain (20 May 2017 19:46)  Awake, alert, comfortable in bed in NAD.    INTERVAL HPI/OVERNIGHT EVENTS:      VITAL SIGNS:  T(F): 98.6  HR: 62  BP: 112/74  RR: 17  SpO2: 99%  Wt(kg): --  I&O's Detail    I & Os for current day (as of 26 May 2017 09:59)  =============================================  IN:    Oral Fluid: 436 ml    Total IN: 436 ml  ---------------------------------------------  OUT:    Voided: 650 ml    Total OUT: 650 ml  ---------------------------------------------  Total NET: -214 ml          REVIEW OF SYSTEMS:    CONSTITUTIONAL:  No fevers, chills, sweats    HEENT:  Eyes:  No diplopia or blurred vision. ENT:  No earache, sore throat or runny nose.    CARDIOVASCULAR:  No pressure, squeezing, tightness, or heaviness about the chest; no palpitations.    RESPIRATORY:  Per HPI    GASTROINTESTINAL:  No abdominal pain, nausea, vomiting or diarrhea.    GENITOURINARY:  No dysuria, frequency or urgency.    NEUROLOGIC:  No paresthesias, fasciculations, seizures or weakness.    PSYCHIATRIC:  No disorder of thought or mood.      PHYSICAL EXAM:    Constitutional: Well developed and nourished  Eyes:Perrla  ENMT: normal  Neck:supple  Respiratory: good air entry  Cardiovascular: S1 S2 regular  Gastrointestinal: Soft, Non tender  Extremities: No edema  Vascular:normal  Neurological:Awake, alert,Ox3  Musculoskeletal:Normal      MEDICATIONS  (STANDING):  sodium chloride 3%  Inhalation 3milliLiter(s) Inhalation daily  aspirin enteric coated 81milliGRAM(s) Oral daily  atorvastatin 40milliGRAM(s) Oral at bedtime  metoprolol 25milliGRAM(s) Oral two times a day    MEDICATIONS  (PRN):      Allergies    No Known Allergies    Intolerances        LABS:                        15.2   7.4   )-----------( 219      ( 26 May 2017 06:51 )             47.4     05-26    142  |  106  |  14  ----------------------------<  78  4.5   |  30  |  1.00    Ca    9.3      26 May 2017 06:51      PT/INR - ( 26 May 2017 06:51 )   PT: 11.0 sec;   INR: 1.01 ratio         PTT - ( 26 May 2017 06:51 )  PTT:31.4 sec      CARDIAC MARKERS ( 25 May 2017 19:26 )  <0.015 ng/mL / x     / 54 U/L / x     / <1.0 ng/mL      CAPILLARY BLOOD GLUCOSE        RADIOLOGY & ADDITIONAL TESTS:  CXR: 05/20/2017   IMPRESSION: Bilateral upper lobe opacities, possibly infectious,   inflammatory or neoplastic. Please correlate clinically.    Ct scan chest: 05/20/2017   IMPRESSION:  No evidenceof a pulmonary embolism. Bilateral apical masslike   consolidations with groundglass opacities in the periphery of the   prosthesis worrisome for infectious, inflammatory or neoplastic disease.   Although there is no definite cavitation the possibility of TB cannot be   entirely excluded. Please correlate clinically.    ekg;    echo: Patient is a 36y old  Male who presents with a chief complaint of chest pain (20 May 2017 19:46)  Awake, alert, comfortable in bed in NAD.. scheduled for bronchoscopy today. No cough or sob. Has abnormal Ct scan of chest showing bilateral upper lobe consolidation.    INTERVAL HPI/OVERNIGHT EVENTS:      VITAL SIGNS:  T(F): 98.6  HR: 62  BP: 112/74  RR: 17  SpO2: 99%  Wt(kg): --  I&O's Detail    I & Os for current day (as of 26 May 2017 09:59)  =============================================  IN:    Oral Fluid: 436 ml    Total IN: 436 ml  ---------------------------------------------  OUT:    Voided: 650 ml    Total OUT: 650 ml  ---------------------------------------------  Total NET: -214 ml          REVIEW OF SYSTEMS:    CONSTITUTIONAL:  No fevers, chills, sweats    HEENT:  Eyes:  No diplopia or blurred vision. ENT:  No earache, sore throat or runny nose.    CARDIOVASCULAR:  No pressure, squeezing, tightness, or heaviness about the chest; no palpitations.    RESPIRATORY:  Per HPI    GASTROINTESTINAL:  No abdominal pain, nausea, vomiting or diarrhea.    GENITOURINARY:  No dysuria, frequency or urgency.    NEUROLOGIC:  No paresthesias, fasciculations, seizures or weakness.    PSYCHIATRIC:  No disorder of thought or mood.      PHYSICAL EXAM:    Constitutional: Well developed and nourished  Eyes:Perrla  ENMT: normal  Neck:supple  Respiratory: good air entry  Cardiovascular: S1 S2 regular  Gastrointestinal: Soft, Non tender  Extremities: No edema  Vascular:normal  Neurological:Awake, alert,Ox3  Musculoskeletal: Dry rash on left upper chest      MEDICATIONS  (STANDING):  sodium chloride 3%  Inhalation 3milliLiter(s) Inhalation daily  aspirin enteric coated 81milliGRAM(s) Oral daily  atorvastatin 40milliGRAM(s) Oral at bedtime  metoprolol 25milliGRAM(s) Oral two times a day    MEDICATIONS  (PRN):      Allergies    No Known Allergies    Intolerances        LABS:                        15.2   7.4   )-----------( 219      ( 26 May 2017 06:51 )             47.4     05-26    142  |  106  |  14  ----------------------------<  78  4.5   |  30  |  1.00    Ca    9.3      26 May 2017 06:51      PT/INR - ( 26 May 2017 06:51 )   PT: 11.0 sec;   INR: 1.01 ratio         PTT - ( 26 May 2017 06:51 )  PTT:31.4 sec      CARDIAC MARKERS ( 25 May 2017 19:26 )  <0.015 ng/mL / x     / 54 U/L / x     / <1.0 ng/mL      CAPILLARY BLOOD GLUCOSE        RADIOLOGY & ADDITIONAL TESTS:  CXR: 05/20/2017   IMPRESSION: Bilateral upper lobe opacities, possibly infectious,   inflammatory or neoplastic. Please correlate clinically.    Ct scan chest: 05/20/2017   IMPRESSION:  No evidence of a pulmonary embolism. Bilateral apical masslike   consolidations with groundglass opacities in the periphery of the   prosthesis worrisome for infectious, inflammatory or neoplastic disease.   Although there is no definite cavitation the possibility of TB cannot be   entirely excluded. Please correlate clinically.    ekg;    echo:

## 2017-05-26 NOTE — PROGRESS NOTE ADULT - SUBJECTIVE AND OBJECTIVE BOX
Patient is a 36y old  Male who presents with a chief complaint of chest pain (20 May 2017 19:46)    HPI:  35 y/o M from home. No significant PMH. Presents to ED complaining chest pain, described as sharp, 8/10, localized and pounding since today. Pt had been massaging area over shirt to relieve pain and now has a 2nd degree burn on the area.  __________________________  Overnight Events:  No acute overnight events  __________________________  Vital Signs Last 24 Hrs  T(C): 37.2, Max: 37.2 (05-26 @ 11:42)  T(F): 98.9, Max: 98.9 (05-26 @ 11:42)  HR: 70 (59 - 88)  BP: 126/77 (108/68 - 156/101)  BP(mean): --  RR: 18 (17 - 20)  SpO2: 98% (95% - 100%)  __________________________    MEDICATIONS  (STANDING):  sodium chloride 3%  Inhalation 3milliLiter(s) Inhalation daily  aspirin enteric coated 81milliGRAM(s) Oral daily  atorvastatin 40milliGRAM(s) Oral at bedtime  metoprolol 25milliGRAM(s) Oral two times a day    MEDICATIONS  (PRN):    __________________________    Physical Exam  -------------------------------------------  General: NAD  Neurology: A&Ox3  Respiratory: CTA B/L  CV: S1S2  Abdominal: Soft, NT, ND  MSK: No edema, + peripheral pulses  __________________________                          15.2   7.4   )-----------( 219      ( 26 May 2017 06:51 )             47.4     05-26    142  |  106  |  14  ----------------------------<  78  4.5   |  30  |  1.00    Ca    9.3      26 May 2017 06:51      __________________________  Assessment/Plan  -=-=-=-=-=-=-=-=-=-=-=-=-=-=-=-=-

## 2017-05-26 NOTE — PROGRESS NOTE ADULT - PROBLEM SELECTOR PLAN 1
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 Nogueira  Pulmonology Dr. Barillas

## 2017-05-27 LAB
ANION GAP SERPL CALC-SCNC: 5 MMOL/L — SIGNIFICANT CHANGE UP (ref 5–17)
BASOPHILS # BLD AUTO: 0 K/UL — SIGNIFICANT CHANGE UP (ref 0–0.2)
BASOPHILS NFR BLD AUTO: 0.5 % — SIGNIFICANT CHANGE UP (ref 0–2)
BUN SERPL-MCNC: 17 MG/DL — SIGNIFICANT CHANGE UP (ref 7–18)
CALCIUM SERPL-MCNC: 8.9 MG/DL — SIGNIFICANT CHANGE UP (ref 8.4–10.5)
CHLORIDE SERPL-SCNC: 108 MMOL/L — SIGNIFICANT CHANGE UP (ref 96–108)
CO2 SERPL-SCNC: 28 MMOL/L — SIGNIFICANT CHANGE UP (ref 22–31)
CREAT SERPL-MCNC: 0.91 MG/DL — SIGNIFICANT CHANGE UP (ref 0.5–1.3)
EOSINOPHIL # BLD AUTO: 0.8 K/UL — HIGH (ref 0–0.5)
EOSINOPHIL NFR BLD AUTO: 9.6 % — HIGH (ref 0–6)
GLUCOSE SERPL-MCNC: 76 MG/DL — SIGNIFICANT CHANGE UP (ref 70–99)
GRAM STN FLD: SIGNIFICANT CHANGE UP
HCT VFR BLD CALC: 44.8 % — SIGNIFICANT CHANGE UP (ref 39–50)
HGB BLD-MCNC: 14.7 G/DL — SIGNIFICANT CHANGE UP (ref 13–17)
LYMPHOCYTES # BLD AUTO: 1.5 K/UL — SIGNIFICANT CHANGE UP (ref 1–3.3)
LYMPHOCYTES # BLD AUTO: 17.9 % — SIGNIFICANT CHANGE UP (ref 13–44)
MCHC RBC-ENTMCNC: 32.1 PG — SIGNIFICANT CHANGE UP (ref 27–34)
MCHC RBC-ENTMCNC: 32.9 GM/DL — SIGNIFICANT CHANGE UP (ref 32–36)
MCV RBC AUTO: 97.8 FL — SIGNIFICANT CHANGE UP (ref 80–100)
MONOCYTES # BLD AUTO: 0.7 K/UL — SIGNIFICANT CHANGE UP (ref 0–0.9)
MONOCYTES NFR BLD AUTO: 8.4 % — SIGNIFICANT CHANGE UP (ref 2–14)
NEUTROPHILS # BLD AUTO: 5.3 K/UL — SIGNIFICANT CHANGE UP (ref 1.8–7.4)
NEUTROPHILS NFR BLD AUTO: 63.5 % — SIGNIFICANT CHANGE UP (ref 43–77)
NIGHT BLUE STAIN TISS: SIGNIFICANT CHANGE UP
PLATELET # BLD AUTO: 202 K/UL — SIGNIFICANT CHANGE UP (ref 150–400)
POTASSIUM SERPL-MCNC: 4.6 MMOL/L — SIGNIFICANT CHANGE UP (ref 3.5–5.3)
POTASSIUM SERPL-SCNC: 4.6 MMOL/L — SIGNIFICANT CHANGE UP (ref 3.5–5.3)
RBC # BLD: 4.58 M/UL — SIGNIFICANT CHANGE UP (ref 4.2–5.8)
RBC # FLD: 12.3 % — SIGNIFICANT CHANGE UP (ref 10.3–14.5)
SODIUM SERPL-SCNC: 141 MMOL/L — SIGNIFICANT CHANGE UP (ref 135–145)
SPECIMEN SOURCE: SIGNIFICANT CHANGE UP
SPECIMEN SOURCE: SIGNIFICANT CHANGE UP
WBC # BLD: 8.4 K/UL — SIGNIFICANT CHANGE UP (ref 3.8–10.5)
WBC # FLD AUTO: 8.4 K/UL — SIGNIFICANT CHANGE UP (ref 3.8–10.5)

## 2017-05-27 RX ADMIN — Medication 81 MILLIGRAM(S): at 13:21

## 2017-05-27 RX ADMIN — ATORVASTATIN CALCIUM 40 MILLIGRAM(S): 80 TABLET, FILM COATED ORAL at 21:18

## 2017-05-27 NOTE — PROGRESS NOTE ADULT - SUBJECTIVE AND OBJECTIVE BOX
CHIEF COMPLAINT:Patient is a 36y old  Male who presents with a chief complaint of chest pain (20 May 2017 19:46)    	  REVIEW OF SYSTEMS:  CONSTITUTIONAL: No fever, weight loss, or fatigue  EYES: No eye pain, visual disturbances, or discharge  ENMT:  No difficulty hearing, tinnitus, vertigo; No sinus or throat pain  NECK: No pain or stiffness  RESPIRATORY: No cough, wheezing, chills or hemoptysis; No Shortness of Breath  CARDIOVASCULAR: No chest pain, palpitations, passing out, dizziness, or leg swelling  GASTROINTESTINAL: No abdominal or epigastric pain. No nausea, vomiting, or hematemesis; No diarrhea or constipation. No melena or hematochezia.  GENITOURINARY: No dysuria, frequency, hematuria, or incontinence  NEUROLOGICAL: No headaches, memory loss, loss of strength, numbness, or tremors  SKIN: No itching, burning, rashes, or lesions   LYMPH Nodes: No enlarged glands  ENDOCRINE: No heat or cold intolerance; No hair loss  MUSCULOSKELETAL: No joint pain or swelling; No muscle, back, or extremity pain  PSYCHIATRIC: No depression, anxiety, mood swings, or difficulty sleeping  HEME/LYMPH: No easy bruising, or bleeding gums  ALLERY AND IMMUNOLOGIC: No hives or eczema	    [ ] All others negative	  [ ] Unable to obtain    PHYSICAL EXAM:  T(C): 37.1, Max: 37.1 (05-27 @ 15:57)  HR: 75 (73 - 75)  BP: 108/71 (108/71 - 110/64)  RR: 16 (16 - 16)  SpO2: 95% (95% - 95%)  Wt(kg): --  I&O's Summary      Appearance: Normal	  HEENT:   Normal oral mucosa, PERRL, EOMI	  Lymphatic: No lymphadenopathy  Cardiovascular: Normal S1 S2, No JVD, No murmurs, No edema  Respiratory: Lungs clear to auscultation	  Psychiatry: A & O x 3, Mood & affect appropriate  Gastrointestinal:  Soft, Non-tender, + BS	  Skin: chest skin markings are from rubbing of chest, no vessicles	  Neurologic: Non-focal  Extremities: Normal range of motion, No clubbing, cyanosis or edema  Vascular: Peripheral pulses palpable 2+ bilaterally    MEDICATIONS  (STANDING):  sodium chloride 3%  Inhalation 3milliLiter(s) Inhalation daily  aspirin enteric coated 81milliGRAM(s) Oral daily  atorvastatin 40milliGRAM(s) Oral at bedtime      TELEMETRY: 	    ECG:  	  RADIOLOGY:  OTHER: 	  	  LABS:	 	    CARDIAC MARKERS:                                14.7   8.4   )-----------( 202      ( 27 May 2017 06:50 )             44.8     05-27    141  |  108  |  17  ----------------------------<  76  4.6   |  28  |  0.91    Ca    8.9      27 May 2017 06:50      proBNP:   Lipid Profile: Cholesterol 179    HDL 50  TG 84    HgA1c: Hemoglobin A1C, Whole Blood: 5.2 % (05-21 @ 11:53)    TSH:

## 2017-05-27 NOTE — PROGRESS NOTE ADULT - ASSESSMENT
37 y/o M from home. No significant PMH. Presents to ED complaining chest pain now improved. comfortable today with no complaints. like d/c in am to follow up with dr toussaint as out patient. and follow up with pmd.

## 2017-05-27 NOTE — PROGRESS NOTE ADULT - PROBLEM SELECTOR PLAN 1
on CTA with B/L apical masslike consolidations with ground-glass opacities in the periphery of the prosthesis worrisome for lymphome. 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 Nogueira  Pulmonology Dr. Barillas

## 2017-05-27 NOTE — PROGRESS NOTE ADULT - PROBLEM SELECTOR PLAN 1
Cont meds  Check path of lung biopsy  Consider D/C home and follow results with pulmonary as outpatient.

## 2017-05-27 NOTE — PROGRESS NOTE ADULT - SUBJECTIVE AND OBJECTIVE BOX
Patient is a 36y old  Male who presents with a chief complaint of chest pain (20 May 2017 19:46)  Awake, alert, comfortable in bed in NAD. S/p Bronchoscopy with biopsy yesterday. No cough or sob, nor chest pain    INTERVAL HPI/OVERNIGHT EVENTS:      VITAL SIGNS:  T(F): 98  HR: 73  BP: 110/64  RR: 16  SpO2: 95%  Wt(kg): --  I&O's Detail          REVIEW OF SYSTEMS:    CONSTITUTIONAL:  No fevers, chills, sweats    HEENT:  Eyes:  No diplopia or blurred vision. ENT:  No earache, sore throat or runny nose.    CARDIOVASCULAR:  No pressure, squeezing, tightness, or heaviness about the chest; no palpitations.    RESPIRATORY:  Per HPI    GASTROINTESTINAL:  No abdominal pain, nausea, vomiting or diarrhea.    GENITOURINARY:  No dysuria, frequency or urgency.    NEUROLOGIC:  No paresthesias, fasciculations, seizures or weakness.    PSYCHIATRIC:  No disorder of thought or mood.      PHYSICAL EXAM:    Constitutional: Well developed and nourished  Eyes:Perrla  ENMT: normal  Neck:supple  Respiratory: good air entry  Cardiovascular: S1 S2 regular  Gastrointestinal: Soft, Non tender  Extremities: No edema  Vascular:normal  Neurological:Awake, alert,Ox3  Musculoskeletal:Normal      MEDICATIONS  (STANDING):  sodium chloride 3%  Inhalation 3milliLiter(s) Inhalation daily  aspirin enteric coated 81milliGRAM(s) Oral daily  atorvastatin 40milliGRAM(s) Oral at bedtime    MEDICATIONS  (PRN):  acetaminophen   Tablet. 650milliGRAM(s) Oral every 6 hours PRN Mild Pain (1 - 3)      Allergies    No Known Allergies    Intolerances        LABS:                        14.7   8.4   )-----------( 202      ( 27 May 2017 06:50 )             44.8     05-27    141  |  108  |  17  ----------------------------<  76  4.6   |  28  |  0.91    Ca    8.9      27 May 2017 06:50      PT/INR - ( 26 May 2017 06:51 )   PT: 11.0 sec;   INR: 1.01 ratio         PTT - ( 26 May 2017 06:51 )  PTT:31.4 sec      CARDIAC MARKERS ( 25 May 2017 19:26 )  <0.015 ng/mL / x     / 54 U/L / x     / <1.0 ng/mL      CAPILLARY BLOOD GLUCOSE        RADIOLOGY & ADDITIONAL TESTS:    CXR:    Ct scan chest:    ekg;    echo:

## 2017-05-28 RX ADMIN — Medication 81 MILLIGRAM(S): at 12:21

## 2017-05-28 RX ADMIN — ATORVASTATIN CALCIUM 40 MILLIGRAM(S): 80 TABLET, FILM COATED ORAL at 21:57

## 2017-05-28 NOTE — PROGRESS NOTE ADULT - SUBJECTIVE AND OBJECTIVE BOX
Patient is a 36y old  Male who presents with a chief complaint of chest pain (20 May 2017 19:46)      INTERVAL HPI/OVERNIGHT EVENTS:      VITAL SIGNS:  T(F): 97.9  HR: 72  BP: 113/71  RR: 16  SpO2: 97%  Wt(kg): --  I&O's Detail          REVIEW OF SYSTEMS:    CONSTITUTIONAL:  No fevers, chills, sweats    HEENT:  Eyes:  No diplopia or blurred vision. ENT:  No earache, sore throat or runny nose.    CARDIOVASCULAR:  No pressure, squeezing, tightness, or heaviness about the chest; no palpitations.    RESPIRATORY:  Per HPI    GASTROINTESTINAL:  No abdominal pain, nausea, vomiting or diarrhea.    GENITOURINARY:  No dysuria, frequency or urgency.    NEUROLOGIC:  No paresthesias, fasciculations, seizures or weakness.    PSYCHIATRIC:  No disorder of thought or mood.      PHYSICAL EXAM:    Constitutional: Well developed and nourished  Eyes:Perrla  ENMT: normal  Neck:supple  Respiratory: good air entry  Cardiovascular: S1 S2 regular  Gastrointestinal: Soft, Non tender  Extremities: No edema  Vascular:normal  Neurological:Awake, alert,Ox3  Musculoskeletal:Normal      MEDICATIONS  (STANDING):  sodium chloride 3%  Inhalation 3milliLiter(s) Inhalation daily  aspirin enteric coated 81milliGRAM(s) Oral daily  atorvastatin 40milliGRAM(s) Oral at bedtime    MEDICATIONS  (PRN):  acetaminophen   Tablet. 650milliGRAM(s) Oral every 6 hours PRN Mild Pain (1 - 3)      Allergies    No Known Allergies    Intolerances        LABS:                        14.7   8.4   )-----------( 202      ( 27 May 2017 06:50 )             44.8     05-27    141  |  108  |  17  ----------------------------<  76  4.6   |  28  |  0.91    Ca    8.9      27 May 2017 06:50                CAPILLARY BLOOD GLUCOSE        RADIOLOGY & ADDITIONAL TESTS:    CXR:    The mediastinal cardiac silhouette is unremarkable.    Left upper lobe lung nodule. Tiny left apical pneumothorax.    No acute osseous finding.     IMPRESSION:    Tiny left apical pneumothorax.        Ct scan chest:    ekg;    echo: Patient is a 36y old  Male who presents with a chief complaint of chest pain (20 May 2017 19:46)  Awake, alert, Lying in bed in NAD. Awaiting biopsy results of Bronchoscopy    INTERVAL HPI/OVERNIGHT EVENTS:      VITAL SIGNS:  T(F): 97.9  HR: 72  BP: 113/71  RR: 16  SpO2: 97%  Wt(kg): --  I&O's Detail          REVIEW OF SYSTEMS:    CONSTITUTIONAL:  No fevers, chills, sweats    HEENT:  Eyes:  No diplopia or blurred vision. ENT:  No earache, sore throat or runny nose.    CARDIOVASCULAR:  No pressure, squeezing, tightness, or heaviness about the chest; no palpitations.    RESPIRATORY:  Per HPI    GASTROINTESTINAL:  No abdominal pain, nausea, vomiting or diarrhea.    GENITOURINARY:  No dysuria, frequency or urgency.    NEUROLOGIC:  No paresthesias, fasciculations, seizures or weakness.    PSYCHIATRIC:  No disorder of thought or mood.      PHYSICAL EXAM:    Constitutional: Well developed and nourished  Eyes:Perrla  ENMT: normal  Neck:supple  Respiratory: good air entry  Cardiovascular: S1 S2 regular  Gastrointestinal: Soft, Non tender  Extremities: No edema  Vascular:normal  Neurological:Awake, alert,Ox3  Musculoskeletal:Normal      MEDICATIONS  (STANDING):  sodium chloride 3%  Inhalation 3milliLiter(s) Inhalation daily  aspirin enteric coated 81milliGRAM(s) Oral daily  atorvastatin 40milliGRAM(s) Oral at bedtime    MEDICATIONS  (PRN):  acetaminophen   Tablet. 650milliGRAM(s) Oral every 6 hours PRN Mild Pain (1 - 3)      Allergies    No Known Allergies    Intolerances        LABS:                        14.7   8.4   )-----------( 202      ( 27 May 2017 06:50 )             44.8     05-27    141  |  108  |  17  ----------------------------<  76  4.6   |  28  |  0.91    Ca    8.9      27 May 2017 06:50                CAPILLARY BLOOD GLUCOSE        RADIOLOGY & ADDITIONAL TESTS:    CXR:    The mediastinal cardiac silhouette is unremarkable.    Left upper lobe lung nodule. Tiny left apical pneumothorax.    No acute osseous finding.     IMPRESSION:    Tiny left apical pneumothorax.        Ct scan chest:    ekg;    echo:

## 2017-05-28 NOTE — DIETITIAN INITIAL EVALUATION ADULT. - OTHER INFO
nutrition assessment for length of stay; skin intact; lives home PTA; denied GI distress, chewing or swallowing problem at present, no specific food choices reported; tolerating 100% meals per flow sheets; s/p Bronchoscopy/biopsy for lung mass

## 2017-05-28 NOTE — PROGRESS NOTE ADULT - ASSESSMENT
doing well with no complaints. had bronchoscopy, biopsy results pending  cleared by pulmonary to go home and follow up results as out patient with dr toussaint. stable for disccharge.

## 2017-05-28 NOTE — PROGRESS NOTE ADULT - PROBLEM SELECTOR PROBLEM 1
Lung mass

## 2017-05-28 NOTE — PROGRESS NOTE ADULT - PROBLEM SELECTOR PROBLEM 3
Pneumonia
Prophylactic measure

## 2017-05-28 NOTE — PROGRESS NOTE ADULT - PROVIDER SPECIALTY LIST ADULT
Infectious Disease
Internal Medicine
Pulmonology
Internal Medicine

## 2017-05-28 NOTE — PROGRESS NOTE ADULT - PROBLEM SELECTOR PROBLEM 2
Chest pain, unspecified type

## 2017-05-28 NOTE — PROGRESS NOTE ADULT - PROBLEM SELECTOR PLAN 4
ID consult  Valacyclovir PO

## 2017-05-28 NOTE — PROGRESS NOTE ADULT - SUBJECTIVE AND OBJECTIVE BOX
CHIEF COMPLAINT:Patient is a 36y old  Male who presents with a chief complaint of chest pain (20 May 2017 19:46)    	  REVIEW OF SYSTEMS:  CONSTITUTIONAL: No fever, weight loss, or fatigue  EYES: No eye pain, visual disturbances, or discharge  ENMT:  No difficulty hearing, tinnitus, vertigo; No sinus or throat pain  NECK: No pain or stiffness  RESPIRATORY: No cough, wheezing, chills or hemoptysis; No Shortness of Breath  CARDIOVASCULAR: No chest pain, palpitations, passing out, dizziness, or leg swelling  GASTROINTESTINAL: No abdominal or epigastric pain. No nausea, vomiting, or hematemesis; No diarrhea or constipation. No melena or hematochezia.  GENITOURINARY: No dysuria, frequency, hematuria, or incontinence  NEUROLOGICAL: No headaches, memory loss, loss of strength, numbness, or tremors  SKIN: No itching, burning, rashes, or lesions   LYMPH Nodes: No enlarged glands  ENDOCRINE: No heat or cold intolerance; No hair loss  MUSCULOSKELETAL: No joint pain or swelling; No muscle, back, or extremity pain  PSYCHIATRIC: No depression, anxiety, mood swings, or difficulty sleeping  HEME/LYMPH: No easy bruising, or bleeding gums  ALLERY AND IMMUNOLOGIC: No hives or eczema	    [ ] All others negative	  [ ] Unable to obtain    PHYSICAL EXAM:  T(C): 36.9, Max: 36.9 (05-28 @ 15:50)  HR: 75 (70 - 75)  BP: 121/81 (110/69 - 121/81)  RR: 16 (16 - 16)  SpO2: 99% (97% - 99%)  Wt(kg): --  I&O's Summary      Appearance: Normal	  HEENT:   Normal oral mucosa, PERRL, EOMI	  Lymphatic: No lymphadenopathy  Cardiovascular: Normal S1 S2, No JVD, No murmurs, No edema  Respiratory: Lungs clear to auscultation	  Psychiatry: A & O x 3, Mood & affect appropriate  Gastrointestinal:  Soft, Non-tender, + BS	  Skin: No rashes, No ecchymoses, No cyanosis	  Neurologic: Non-focal  Extremities: Normal range of motion, No clubbing, cyanosis or edema  Vascular: Peripheral pulses palpable 2+ bilaterally    MEDICATIONS  (STANDING):  sodium chloride 3%  Inhalation 3milliLiter(s) Inhalation daily  aspirin enteric coated 81milliGRAM(s) Oral daily  atorvastatin 40milliGRAM(s) Oral at bedtime      TELEMETRY: 	    ECG:  	  RADIOLOGY:  OTHER: 	  	  LABS:	 	    CARDIAC MARKERS:                                14.7   8.4   )-----------( 202      ( 27 May 2017 06:50 )             44.8     05-27    141  |  108  |  17  ----------------------------<  76  4.6   |  28  |  0.91    Ca    8.9      27 May 2017 06:50      proBNP:   Lipid Profile: Cholesterol 179    HDL 50  TG 84    HgA1c: Hemoglobin A1C, Whole Blood: 5.2 % (05-21 @ 11:53)    TSH:

## 2017-05-29 VITALS
SYSTOLIC BLOOD PRESSURE: 109 MMHG | OXYGEN SATURATION: 98 % | TEMPERATURE: 98 F | RESPIRATION RATE: 16 BRPM | DIASTOLIC BLOOD PRESSURE: 72 MMHG | HEART RATE: 69 BPM

## 2017-05-29 LAB
ANION GAP SERPL CALC-SCNC: 6 MMOL/L — SIGNIFICANT CHANGE UP (ref 5–17)
BASOPHILS # BLD AUTO: 0.1 K/UL — SIGNIFICANT CHANGE UP (ref 0–0.2)
BASOPHILS NFR BLD AUTO: 0.8 % — SIGNIFICANT CHANGE UP (ref 0–2)
BUN SERPL-MCNC: 16 MG/DL — SIGNIFICANT CHANGE UP (ref 7–18)
CALCIUM SERPL-MCNC: 8.7 MG/DL — SIGNIFICANT CHANGE UP (ref 8.4–10.5)
CHLORIDE SERPL-SCNC: 111 MMOL/L — HIGH (ref 96–108)
CO2 SERPL-SCNC: 24 MMOL/L — SIGNIFICANT CHANGE UP (ref 22–31)
CREAT SERPL-MCNC: 0.8 MG/DL — SIGNIFICANT CHANGE UP (ref 0.5–1.3)
CULTURE RESULTS: SIGNIFICANT CHANGE UP
EOSINOPHIL # BLD AUTO: 0.9 K/UL — HIGH (ref 0–0.5)
EOSINOPHIL NFR BLD AUTO: 11.9 % — HIGH (ref 0–6)
GLUCOSE SERPL-MCNC: 81 MG/DL — SIGNIFICANT CHANGE UP (ref 70–99)
HCT VFR BLD CALC: 43.5 % — SIGNIFICANT CHANGE UP (ref 39–50)
HGB BLD-MCNC: 14.3 G/DL — SIGNIFICANT CHANGE UP (ref 13–17)
LYMPHOCYTES # BLD AUTO: 1.4 K/UL — SIGNIFICANT CHANGE UP (ref 1–3.3)
LYMPHOCYTES # BLD AUTO: 19.6 % — SIGNIFICANT CHANGE UP (ref 13–44)
MCHC RBC-ENTMCNC: 31.7 PG — SIGNIFICANT CHANGE UP (ref 27–34)
MCHC RBC-ENTMCNC: 32.8 GM/DL — SIGNIFICANT CHANGE UP (ref 32–36)
MCV RBC AUTO: 96.7 FL — SIGNIFICANT CHANGE UP (ref 80–100)
MONOCYTES # BLD AUTO: 0.6 K/UL — SIGNIFICANT CHANGE UP (ref 0–0.9)
MONOCYTES NFR BLD AUTO: 7.9 % — SIGNIFICANT CHANGE UP (ref 2–14)
NEUTROPHILS # BLD AUTO: 4.4 K/UL — SIGNIFICANT CHANGE UP (ref 1.8–7.4)
NEUTROPHILS NFR BLD AUTO: 59.8 % — SIGNIFICANT CHANGE UP (ref 43–77)
PLATELET # BLD AUTO: 191 K/UL — SIGNIFICANT CHANGE UP (ref 150–400)
POTASSIUM SERPL-MCNC: 4.1 MMOL/L — SIGNIFICANT CHANGE UP (ref 3.5–5.3)
POTASSIUM SERPL-SCNC: 4.1 MMOL/L — SIGNIFICANT CHANGE UP (ref 3.5–5.3)
RBC # BLD: 4.5 M/UL — SIGNIFICANT CHANGE UP (ref 4.2–5.8)
RBC # FLD: 12 % — SIGNIFICANT CHANGE UP (ref 10.3–14.5)
SODIUM SERPL-SCNC: 141 MMOL/L — SIGNIFICANT CHANGE UP (ref 135–145)
SPECIMEN SOURCE: SIGNIFICANT CHANGE UP
WBC # BLD: 7.3 K/UL — SIGNIFICANT CHANGE UP (ref 3.8–10.5)
WBC # FLD AUTO: 7.3 K/UL — SIGNIFICANT CHANGE UP (ref 3.8–10.5)

## 2017-05-29 PROCEDURE — 87015 SPECIMEN INFECT AGNT CONCNTJ: CPT

## 2017-05-29 PROCEDURE — 80053 COMPREHEN METABOLIC PANEL: CPT

## 2017-05-29 PROCEDURE — 84484 ASSAY OF TROPONIN QUANT: CPT

## 2017-05-29 PROCEDURE — 83690 ASSAY OF LIPASE: CPT

## 2017-05-29 PROCEDURE — 84100 ASSAY OF PHOSPHORUS: CPT

## 2017-05-29 PROCEDURE — 88305 TISSUE EXAM BY PATHOLOGIST: CPT

## 2017-05-29 PROCEDURE — 83735 ASSAY OF MAGNESIUM: CPT

## 2017-05-29 PROCEDURE — 80307 DRUG TEST PRSMV CHEM ANLYZR: CPT

## 2017-05-29 PROCEDURE — 86900 BLOOD TYPING SEROLOGIC ABO: CPT

## 2017-05-29 PROCEDURE — 80048 BASIC METABOLIC PNL TOTAL CA: CPT

## 2017-05-29 PROCEDURE — 87070 CULTURE OTHR SPECIMN AEROBIC: CPT

## 2017-05-29 PROCEDURE — 88112 CYTOPATH CELL ENHANCE TECH: CPT

## 2017-05-29 PROCEDURE — 94640 AIRWAY INHALATION TREATMENT: CPT

## 2017-05-29 PROCEDURE — 96374 THER/PROPH/DIAG INJ IV PUSH: CPT

## 2017-05-29 PROCEDURE — 86901 BLOOD TYPING SEROLOGIC RH(D): CPT

## 2017-05-29 PROCEDURE — 93005 ELECTROCARDIOGRAM TRACING: CPT

## 2017-05-29 PROCEDURE — 99285 EMERGENCY DEPT VISIT HI MDM: CPT | Mod: 25

## 2017-05-29 PROCEDURE — 87206 SMEAR FLUORESCENT/ACID STAI: CPT

## 2017-05-29 PROCEDURE — 82553 CREATINE MB FRACTION: CPT

## 2017-05-29 PROCEDURE — 85027 COMPLETE CBC AUTOMATED: CPT

## 2017-05-29 PROCEDURE — 82550 ASSAY OF CK (CPK): CPT

## 2017-05-29 PROCEDURE — 71046 X-RAY EXAM CHEST 2 VIEWS: CPT

## 2017-05-29 PROCEDURE — 96375 TX/PRO/DX INJ NEW DRUG ADDON: CPT

## 2017-05-29 PROCEDURE — 87040 BLOOD CULTURE FOR BACTERIA: CPT

## 2017-05-29 PROCEDURE — 87389 HIV-1 AG W/HIV-1&-2 AB AG IA: CPT

## 2017-05-29 PROCEDURE — 71275 CT ANGIOGRAPHY CHEST: CPT

## 2017-05-29 PROCEDURE — 80061 LIPID PANEL: CPT

## 2017-05-29 PROCEDURE — 71045 X-RAY EXAM CHEST 1 VIEW: CPT

## 2017-05-29 PROCEDURE — 83036 HEMOGLOBIN GLYCOSYLATED A1C: CPT

## 2017-05-29 PROCEDURE — 86850 RBC ANTIBODY SCREEN: CPT

## 2017-05-29 PROCEDURE — 85610 PROTHROMBIN TIME: CPT

## 2017-05-29 PROCEDURE — 87116 MYCOBACTERIA CULTURE: CPT

## 2017-05-29 PROCEDURE — 86480 TB TEST CELL IMMUN MEASURE: CPT

## 2017-05-29 PROCEDURE — 85730 THROMBOPLASTIN TIME PARTIAL: CPT

## 2017-05-29 RX ORDER — ASPIRIN/CALCIUM CARB/MAGNESIUM 324 MG
1 TABLET ORAL
Qty: 0 | Refills: 0 | COMMUNITY
Start: 2017-05-29

## 2017-05-29 RX ORDER — ATORVASTATIN CALCIUM 80 MG/1
1 TABLET, FILM COATED ORAL
Qty: 0 | Refills: 0 | COMMUNITY
Start: 2017-05-29

## 2017-05-29 NOTE — DISCHARGE NOTE ADULT - HOSPITAL COURSE
35 yo man initially presented to ER c/o chest pain and was found to have left chest ulceration likely secondary to friction burns as patient said he kept rubbing the area because of pain. Patient was initially placed on telemetry which was later discontinued after 3 set of troponins were negative and no events were noted on telemetry. CXR done on admission showed Bilateral upper lobe opacities. CT angio chest showed  Bilateral apical masslike consolidations with groundglass opacities. Sputum AFP was negative x3. 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 Dr Barillas (Pulmonary) was consulted and 37 yo man initially presented to ER c/o chest pain and was found to have left chest ulceration likely secondary to friction burns as patient said he kept rubbing the area because of pain. Patient was initially placed on telemetry which was later discontinued after 3 set of troponins were negative and no events were noted on telemetry. CXR done on admission showed Bilateral upper lobe opacities. CT angio chest showed- Bilateral apical masslike consolidations with groundglass opacities. Sputum AFP was negative x3. 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 Dr. Nogueira (ID) consulted. Dr Barillas (Pulmonary) was consulted and patient had bronchoscopy with biopsy on 5/26/2017, results of biopsy is pending at this time and patient is advised to follow up with Dr. Barillas as outpatient for biopsy results. Post bronchoscopy CXR showed a small left apical pneumothorax. Patient remains stable with no current complaints of shortness of breath and not requiring supplemental oxygen. Discussed with Dr. Medeiros, patient is stable for discharge home with further follow up as outpatient.

## 2017-05-29 NOTE — DISCHARGE NOTE ADULT - PLAN OF CARE
Prevention of complications You had bronchoscopy with biopsy done on 5/26/17. Biopsy results are pending at this time. Follow up with Dr. Barillas (Pulmonary) for results as outpatient. Resolution Do not rub the left chest area to prevent further damage of skin. If ulceration worsens or you notice any discharge or worsening redness then follow up with your PCP or come to the ER. Smoking cessation strongly advised.
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
## 2017-05-29 NOTE — DISCHARGE NOTE ADULT - PATIENT PORTAL LINK FT
“You can access the FollowHealth Patient Portal, offered by [facility], by registering with the following website: [email or web address]”

## 2017-05-30 LAB — NON-GYNECOLOGICAL CYTOLOGY STUDY: SIGNIFICANT CHANGE UP

## 2017-05-31 ENCOUNTER — INPATIENT (INPATIENT)
Facility: HOSPITAL | Age: 37
LOS: 0 days | Discharge: HOME | DRG: 201 | End: 2017-06-01
Attending: FAMILY MEDICINE | Admitting: FAMILY MEDICINE
Payer: MEDICAID

## 2017-05-31 VITALS
HEART RATE: 83 BPM | DIASTOLIC BLOOD PRESSURE: 93 MMHG | WEIGHT: 145.06 LBS | OXYGEN SATURATION: 98 % | RESPIRATION RATE: 18 BRPM | SYSTOLIC BLOOD PRESSURE: 129 MMHG | HEIGHT: 65 IN | TEMPERATURE: 98 F

## 2017-05-31 DIAGNOSIS — J93.9 PNEUMOTHORAX, UNSPECIFIED: ICD-10-CM

## 2017-05-31 DIAGNOSIS — Z29.9 ENCOUNTER FOR PROPHYLACTIC MEASURES, UNSPECIFIED: ICD-10-CM

## 2017-05-31 DIAGNOSIS — R07.9 CHEST PAIN, UNSPECIFIED: ICD-10-CM

## 2017-05-31 DIAGNOSIS — Z20.1 CONTACT WITH AND (SUSPECTED) EXPOSURE TO TUBERCULOSIS: ICD-10-CM

## 2017-05-31 DIAGNOSIS — R07.89 OTHER CHEST PAIN: ICD-10-CM

## 2017-05-31 DIAGNOSIS — R91.8 OTHER NONSPECIFIC ABNORMAL FINDING OF LUNG FIELD: ICD-10-CM

## 2017-05-31 DIAGNOSIS — L98.491 NON-PRESSURE CHRONIC ULCER OF SKIN OF OTHER SITES LIMITED TO BREAKDOWN OF SKIN: ICD-10-CM

## 2017-05-31 DIAGNOSIS — F17.210 NICOTINE DEPENDENCE, CIGARETTES, UNCOMPLICATED: ICD-10-CM

## 2017-05-31 LAB
ALBUMIN SERPL ELPH-MCNC: 3.7 G/DL — SIGNIFICANT CHANGE UP (ref 3.5–5)
ALP SERPL-CCNC: 100 U/L — SIGNIFICANT CHANGE UP (ref 40–120)
ALT FLD-CCNC: 52 U/L DA — SIGNIFICANT CHANGE UP (ref 10–60)
ANION GAP SERPL CALC-SCNC: 5 MMOL/L — SIGNIFICANT CHANGE UP (ref 5–17)
AST SERPL-CCNC: 28 U/L — SIGNIFICANT CHANGE UP (ref 10–40)
BASOPHILS # BLD AUTO: 0.1 K/UL — SIGNIFICANT CHANGE UP (ref 0–0.2)
BASOPHILS NFR BLD AUTO: 1 % — SIGNIFICANT CHANGE UP (ref 0–2)
BILIRUB SERPL-MCNC: 0.4 MG/DL — SIGNIFICANT CHANGE UP (ref 0.2–1.2)
BUN SERPL-MCNC: 12 MG/DL — SIGNIFICANT CHANGE UP (ref 7–18)
CALCIUM SERPL-MCNC: 9.2 MG/DL — SIGNIFICANT CHANGE UP (ref 8.4–10.5)
CHLORIDE SERPL-SCNC: 107 MMOL/L — SIGNIFICANT CHANGE UP (ref 96–108)
CO2 SERPL-SCNC: 29 MMOL/L — SIGNIFICANT CHANGE UP (ref 22–31)
CREAT SERPL-MCNC: 0.91 MG/DL — SIGNIFICANT CHANGE UP (ref 0.5–1.3)
EOSINOPHIL # BLD AUTO: 0.8 K/UL — HIGH (ref 0–0.5)
EOSINOPHIL NFR BLD AUTO: 11.2 % — HIGH (ref 0–6)
GLUCOSE SERPL-MCNC: 78 MG/DL — SIGNIFICANT CHANGE UP (ref 70–99)
HCT VFR BLD CALC: 45.9 % — SIGNIFICANT CHANGE UP (ref 39–50)
HGB BLD-MCNC: 15 G/DL — SIGNIFICANT CHANGE UP (ref 13–17)
LYMPHOCYTES # BLD AUTO: 1.3 K/UL — SIGNIFICANT CHANGE UP (ref 1–3.3)
LYMPHOCYTES # BLD AUTO: 18.9 % — SIGNIFICANT CHANGE UP (ref 13–44)
MCHC RBC-ENTMCNC: 31.5 PG — SIGNIFICANT CHANGE UP (ref 27–34)
MCHC RBC-ENTMCNC: 32.7 GM/DL — SIGNIFICANT CHANGE UP (ref 32–36)
MCV RBC AUTO: 96.5 FL — SIGNIFICANT CHANGE UP (ref 80–100)
MONOCYTES # BLD AUTO: 0.5 K/UL — SIGNIFICANT CHANGE UP (ref 0–0.9)
MONOCYTES NFR BLD AUTO: 7.7 % — SIGNIFICANT CHANGE UP (ref 2–14)
NEUTROPHILS # BLD AUTO: 4.3 K/UL — SIGNIFICANT CHANGE UP (ref 1.8–7.4)
NEUTROPHILS NFR BLD AUTO: 61.2 % — SIGNIFICANT CHANGE UP (ref 43–77)
PLATELET # BLD AUTO: 224 K/UL — SIGNIFICANT CHANGE UP (ref 150–400)
POTASSIUM SERPL-MCNC: 4.3 MMOL/L — SIGNIFICANT CHANGE UP (ref 3.5–5.3)
POTASSIUM SERPL-SCNC: 4.3 MMOL/L — SIGNIFICANT CHANGE UP (ref 3.5–5.3)
PROT SERPL-MCNC: 7.7 G/DL — SIGNIFICANT CHANGE UP (ref 6–8.3)
RBC # BLD: 4.76 M/UL — SIGNIFICANT CHANGE UP (ref 4.2–5.8)
RBC # FLD: 12.1 % — SIGNIFICANT CHANGE UP (ref 10.3–14.5)
SODIUM SERPL-SCNC: 141 MMOL/L — SIGNIFICANT CHANGE UP (ref 135–145)
SURGICAL PATHOLOGY FINAL REPORT - CH: SIGNIFICANT CHANGE UP
WBC # BLD: 7 K/UL — SIGNIFICANT CHANGE UP (ref 3.8–10.5)
WBC # FLD AUTO: 7 K/UL — SIGNIFICANT CHANGE UP (ref 3.8–10.5)

## 2017-05-31 PROCEDURE — 71020: CPT | Mod: 26

## 2017-05-31 PROCEDURE — 99285 EMERGENCY DEPT VISIT HI MDM: CPT

## 2017-05-31 RX ORDER — IBUPROFEN 200 MG
600 TABLET ORAL EVERY 12 HOURS
Qty: 0 | Refills: 0 | Status: DISCONTINUED | OUTPATIENT
Start: 2017-05-31 | End: 2017-06-01

## 2017-05-31 RX ORDER — IBUPROFEN 200 MG
600 TABLET ORAL ONCE
Qty: 0 | Refills: 0 | Status: COMPLETED | OUTPATIENT
Start: 2017-05-31 | End: 2017-05-31

## 2017-05-31 RX ADMIN — Medication 600 MILLIGRAM(S): at 09:45

## 2017-05-31 RX ADMIN — Medication 600 MILLIGRAM(S): at 17:31

## 2017-05-31 RX ADMIN — Medication 600 MILLIGRAM(S): at 18:05

## 2017-05-31 NOTE — H&P ADULT - ATTENDING COMMENTS
patient seen and examined. case fully discussed with  ER attending and medical resident. reviewed chief complaint. reviewed review of systems. reviewed list of medication,  reviewed physical exam. reviewed assessment and plan. agree with full H and P. patient is clinically stable, will follow up with pulmonary, Dr toussaint.
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
## 2017-05-31 NOTE — H&P ADULT - PROBLEM SELECTOR PLAN 1
pneumothorax:  cxr Small left apical pneumothorax increased in size compared to 5/26/2017  patient is not tacypneic and does not appear to be in any distress currently  c/w watchful monitoring  oxygen supplementation , does not qualify for chest tube placemen currently  f/u repeat CXR in am pneumothorax:  cxr Small left apical pneumothorax increased in size compared to 5/26/2017  patient is not tacypneic and does not appear to be in any distress currently  c/w watchful monitoring  oxygen supplementation , does not qualify for chest tube placemen currently  f/u repeat CXR in am  Dr Barillas pulmonology evaluation

## 2017-05-31 NOTE — H&P ADULT - PROBLEM SELECTOR PLAN 3
tb exposure in past : s/p 3 negative afp with positve QuantiFeron gold and ppd  cleared by ID   s/p bronchoscopy biopsy showing :BRONCHIAL BRUSH  NEGATIVE FOR MALIGNANT CELLS.The cytology slides consist of ciliated bronchial cells and rarealveolar macrophages.  Left lung, upper lobe; transbronchial biopsy:  -Unremarkable bronchial tissue and fragments of lung parenchyma  with recent hemorrhage and intra-alveolar hemosiderin laden  histiocytes.  -A peribronchial fibro-anthracotic nodule is present
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
## 2017-05-31 NOTE — ED PROVIDER NOTE - NS ED MD SCRIBE ATTENDING SCRIBE SECTIONS
PHYSICAL EXAM/PROGRESS NOTE/VITAL SIGNS( Pullset)/DISPOSITION/HISTORY OF PRESENT ILLNESS/HIV/REVIEW OF SYSTEMS/PAST MEDICAL/SURGICAL/SOCIAL HISTORY

## 2017-05-31 NOTE — ED PROVIDER NOTE - PROGRESS NOTE DETAILS
Pt was bronched by Dr. Barillas w/ pending biopsy result and small pneumothorax on X-ray.  Pt was also negative AFB and negative TB, pt was positive QuantiFeron. No respiratory distress X-ray found worsening pneumothorax. Pt placed on 2L nasal canula and admitted for repeat X-ray

## 2017-05-31 NOTE — H&P ADULT - RS GEN PE MLT RESP DETAILS PC
airway patent/breath sounds equal/respirations non-labored/clear to auscultation bilaterally/good air movement/normal
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
## 2017-06-01 VITALS
DIASTOLIC BLOOD PRESSURE: 75 MMHG | RESPIRATION RATE: 16 BRPM | SYSTOLIC BLOOD PRESSURE: 114 MMHG | TEMPERATURE: 98 F | HEART RATE: 69 BPM | OXYGEN SATURATION: 97 %

## 2017-06-01 LAB
24R-OH-CALCIDIOL SERPL-MCNC: 17 NG/ML — LOW (ref 30–100)
ALBUMIN SERPL ELPH-MCNC: 3.5 G/DL — SIGNIFICANT CHANGE UP (ref 3.5–5)
ALP SERPL-CCNC: 96 U/L — SIGNIFICANT CHANGE UP (ref 40–120)
ALT FLD-CCNC: 51 U/L DA — SIGNIFICANT CHANGE UP (ref 10–60)
ANION GAP SERPL CALC-SCNC: 6 MMOL/L — SIGNIFICANT CHANGE UP (ref 5–17)
AST SERPL-CCNC: 26 U/L — SIGNIFICANT CHANGE UP (ref 10–40)
BASOPHILS # BLD AUTO: 0 K/UL — SIGNIFICANT CHANGE UP (ref 0–0.2)
BASOPHILS NFR BLD AUTO: 0.8 % — SIGNIFICANT CHANGE UP (ref 0–2)
BILIRUB SERPL-MCNC: 0.6 MG/DL — SIGNIFICANT CHANGE UP (ref 0.2–1.2)
BUN SERPL-MCNC: 17 MG/DL — SIGNIFICANT CHANGE UP (ref 7–18)
CALCIUM SERPL-MCNC: 9.1 MG/DL — SIGNIFICANT CHANGE UP (ref 8.4–10.5)
CHLORIDE SERPL-SCNC: 106 MMOL/L — SIGNIFICANT CHANGE UP (ref 96–108)
CHOLEST SERPL-MCNC: 106 MG/DL — SIGNIFICANT CHANGE UP (ref 10–199)
CO2 SERPL-SCNC: 28 MMOL/L — SIGNIFICANT CHANGE UP (ref 22–31)
CREAT SERPL-MCNC: 0.99 MG/DL — SIGNIFICANT CHANGE UP (ref 0.5–1.3)
EOSINOPHIL # BLD AUTO: 0.7 K/UL — HIGH (ref 0–0.5)
EOSINOPHIL NFR BLD AUTO: 11.4 % — HIGH (ref 0–6)
GLUCOSE SERPL-MCNC: 74 MG/DL — SIGNIFICANT CHANGE UP (ref 70–99)
HBA1C BLD-MCNC: 5.3 % — SIGNIFICANT CHANGE UP (ref 4–5.6)
HCT VFR BLD CALC: 46 % — SIGNIFICANT CHANGE UP (ref 39–50)
HDLC SERPL-MCNC: 35 MG/DL — LOW (ref 40–125)
HGB BLD-MCNC: 15.5 G/DL — SIGNIFICANT CHANGE UP (ref 13–17)
LIPID PNL WITH DIRECT LDL SERPL: 56 MG/DL — SIGNIFICANT CHANGE UP
LYMPHOCYTES # BLD AUTO: 1.6 K/UL — SIGNIFICANT CHANGE UP (ref 1–3.3)
LYMPHOCYTES # BLD AUTO: 26.7 % — SIGNIFICANT CHANGE UP (ref 13–44)
MAGNESIUM SERPL-MCNC: 2.3 MG/DL — SIGNIFICANT CHANGE UP (ref 1.6–2.6)
MCHC RBC-ENTMCNC: 32.3 PG — SIGNIFICANT CHANGE UP (ref 27–34)
MCHC RBC-ENTMCNC: 33.7 GM/DL — SIGNIFICANT CHANGE UP (ref 32–36)
MCV RBC AUTO: 95.9 FL — SIGNIFICANT CHANGE UP (ref 80–100)
MONOCYTES # BLD AUTO: 0.5 K/UL — SIGNIFICANT CHANGE UP (ref 0–0.9)
MONOCYTES NFR BLD AUTO: 8.7 % — SIGNIFICANT CHANGE UP (ref 2–14)
NEUTROPHILS # BLD AUTO: 3.2 K/UL — SIGNIFICANT CHANGE UP (ref 1.8–7.4)
NEUTROPHILS NFR BLD AUTO: 52.4 % — SIGNIFICANT CHANGE UP (ref 43–77)
PHOSPHATE SERPL-MCNC: 3.2 MG/DL — SIGNIFICANT CHANGE UP (ref 2.5–4.5)
PLATELET # BLD AUTO: 206 K/UL — SIGNIFICANT CHANGE UP (ref 150–400)
POTASSIUM SERPL-MCNC: 4.8 MMOL/L — SIGNIFICANT CHANGE UP (ref 3.5–5.3)
POTASSIUM SERPL-SCNC: 4.8 MMOL/L — SIGNIFICANT CHANGE UP (ref 3.5–5.3)
PROT SERPL-MCNC: 7.4 G/DL — SIGNIFICANT CHANGE UP (ref 6–8.3)
RBC # BLD: 4.8 M/UL — SIGNIFICANT CHANGE UP (ref 4.2–5.8)
RBC # FLD: 11.8 % — SIGNIFICANT CHANGE UP (ref 10.3–14.5)
SODIUM SERPL-SCNC: 140 MMOL/L — SIGNIFICANT CHANGE UP (ref 135–145)
TOTAL CHOLESTEROL/HDL RATIO MEASUREMENT: 3 RATIO — LOW (ref 3.4–9.6)
TRIGL SERPL-MCNC: 73 MG/DL — SIGNIFICANT CHANGE UP (ref 10–149)
TSH SERPL-MCNC: 1.63 UU/ML — SIGNIFICANT CHANGE UP (ref 0.34–4.82)
WBC # BLD: 6.1 K/UL — SIGNIFICANT CHANGE UP (ref 3.8–10.5)
WBC # FLD AUTO: 6.1 K/UL — SIGNIFICANT CHANGE UP (ref 3.8–10.5)

## 2017-06-01 PROCEDURE — 84100 ASSAY OF PHOSPHORUS: CPT

## 2017-06-01 PROCEDURE — 80053 COMPREHEN METABOLIC PANEL: CPT

## 2017-06-01 PROCEDURE — 80061 LIPID PANEL: CPT

## 2017-06-01 PROCEDURE — 71046 X-RAY EXAM CHEST 2 VIEWS: CPT

## 2017-06-01 PROCEDURE — 85027 COMPLETE CBC AUTOMATED: CPT

## 2017-06-01 PROCEDURE — 71010: CPT | Mod: 26

## 2017-06-01 PROCEDURE — 83735 ASSAY OF MAGNESIUM: CPT

## 2017-06-01 PROCEDURE — 82306 VITAMIN D 25 HYDROXY: CPT

## 2017-06-01 PROCEDURE — 93005 ELECTROCARDIOGRAM TRACING: CPT

## 2017-06-01 PROCEDURE — 84443 ASSAY THYROID STIM HORMONE: CPT

## 2017-06-01 PROCEDURE — 71045 X-RAY EXAM CHEST 1 VIEW: CPT

## 2017-06-01 PROCEDURE — 83036 HEMOGLOBIN GLYCOSYLATED A1C: CPT

## 2017-06-01 PROCEDURE — 99285 EMERGENCY DEPT VISIT HI MDM: CPT | Mod: 25

## 2017-06-01 RX ADMIN — Medication 600 MILLIGRAM(S): at 05:17

## 2017-06-01 RX ADMIN — Medication 600 MILLIGRAM(S): at 17:09
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
## 2017-06-01 NOTE — DISCHARGE NOTE ADULT - CARE PLAN
Principal Discharge DX:	Pneumothorax, unspecified type  Goal:	resolution  Instructions for follow-up, activity and diet:	out patient pulm follow up  repeat chest x ray in 1 week  no heavy weight lifting and no air travel for 6 weeks  Secondary Diagnosis:	Tuberculosis contact  Instructions for follow-up, activity and diet:	positve QuantiFeron gold and ppd  cleared by ID   s/p bronchoscopy biopsy showing :BRONCHIAL BRUSH  NEGATIVE FOR MALIGNANT CELLS.The cytology slides consist of ciliated bronchial cells and rarealveolar macrophages.  Left lung, upper lobe; transbronchial biopsy:  -Unremarkable bronchial tissue and fragments of lung parenchyma  with recent hemorrhage and intra-alveolar hemosiderin laden  histiocytes.  -A peribronchial fibro-anthracotic nodule is present.   ruled out for TB, out patient pulmonary follow up

## 2017-06-01 NOTE — DISCHARGE NOTE ADULT - PATIENT PORTAL LINK FT
“You can access the FollowHealth Patient Portal, offered by [facility], by registering with the following website: [email or web address]”

## 2017-06-01 NOTE — DISCHARGE NOTE ADULT - CARE PROVIDER_API CALL
Daija Dorantes), Medicine  Dept Director  [address] [place] [address]  Phone: [phone]  Fax: [phone]

## 2017-06-01 NOTE — DISCHARGE NOTE ADULT - PLAN OF CARE
resolution out patient pulm follow up  repeat chest x ray in 1 week  no heavy weight lifting and no air travel for 6 weeks positve QuantiFeron gold and ppd  cleared by ID   s/p bronchoscopy biopsy showing :BRONCHIAL BRUSH  NEGATIVE FOR MALIGNANT CELLS.The cytology slides consist of ciliated bronchial cells and rarealveolar macrophages.  Left lung, upper lobe; transbronchial biopsy:  -Unremarkable bronchial tissue and fragments of lung parenchyma  with recent hemorrhage and intra-alveolar hemosiderin laden  histiocytes.  -A peribronchial fibro-anthracotic nodule is present.   ruled out for TB, out patient pulmonary follow up
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
## 2017-06-01 NOTE — PROGRESS NOTE ADULT - PROBLEM SELECTOR PLAN 1
cxr Small left apical pneumothorax increased in size compared to 5/26/2017  patient is not tacypneic and does not appear to be in any distress currently  c/w watchful monitoring  oxygen supplementation , does not qualify for chest tube placemen currently  Repeated CXR showed stable small left apical pneumothorax.  will repeat another CXR in am  Will get pulmonary consult

## 2017-06-01 NOTE — PROGRESS NOTE ADULT - SUBJECTIVE AND OBJECTIVE BOX
INTERVAL HPI/OVERNIGHT EVENTS: No acute overnight event  Patient is a 36y old  Male who presents with a chief complaint of chest pain    T(C): 36.6, Max: 36.8 (05-31 @ 15:02)  HR: 65 (65 - 80)  BP: 107/67 (107/67 - 124/89)  RR: 16 (16 - 18)  SpO2: 97% (96% - 98%)  Wt(kg): --  I&O's Summary        LABS:                        15.5   6.1   )-----------( 206      ( 01 Jun 2017 07:53 )             46.0     06-01    140  |  106  |  17  ----------------------------<  74  4.8   |  28  |  0.99    Ca    9.1      01 Jun 2017 07:53  Phos  3.2     06-01  Mg     2.3     06-01    TPro  7.4  /  Alb  3.5  /  TBili  0.6  /  DBili  x   /  AST  26  /  ALT  51  /  AlkPhos  96  06-01    CAPILLARY BLOOD GLUCOSE      RADIOLOGY & ADDITIONAL TESTS:    Imaging Personally Reviewed:  [x ] YES  [ ] NO    Consultant(s) Notes Reviewed:  [x ] YES  [ ] NO    PHYSICAL EXAM:  GENERAL: NAD, well-groomed, well-developed  HEAD:  Atraumatic, Normocephalic  EYES: EOMI, PERRLA, conjunctiva and sclera clear  ENMT: No tonsillar erythema, exudates, or enlargement; Moist mucous membranes, Good dentition, No lesions  NECK: Supple, No JVD, Normal thyroid  NERVOUS SYSTEM:  Alert & Oriented X3, Good concentration;   CHEST/LUNG: Clear to percussion bilaterally; No rales, rhonchi, wheezing, or rubs  HEART: Regular rate and rhythm; No murmurs, rubs, or gallops  ABDOMEN: Soft, Nontender, Nondistended; Bowel sounds present  EXTREMITIES:  No edema, No calf tenderness    Care Discussed with the attending [x ] YES  [ ] NO

## 2017-06-01 NOTE — DISCHARGE NOTE ADULT - OTHER SIGNIFICANT FINDINGS
doing well with no complaints. wants to go home today. case fully discussed with pulmonary, dr galo, who is covering for dr toussaint. as per dr galo, patient can go home. will avoid any lifting and any travel for 4-6 weeks. and will follow up with pmd , or dr galo in one week for follow up chest exray in one week. fully understands all risks and will follow up. Biopsy report of bronchoscopy is  negative for TB or Malignancy.

## 2017-06-05 DIAGNOSIS — Z20.1 CONTACT WITH AND (SUSPECTED) EXPOSURE TO TUBERCULOSIS: ICD-10-CM
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
## 2017-06-05 DIAGNOSIS — J93.9 PNEUMOTHORAX, UNSPECIFIED: ICD-10-CM

## 2017-06-05 DIAGNOSIS — F17.210 NICOTINE DEPENDENCE, CIGARETTES, UNCOMPLICATED: ICD-10-CM

## 2017-06-05 DIAGNOSIS — T21.01XA BURN OF UNSPECIFIED DEGREE OF CHEST WALL, INITIAL ENCOUNTER: ICD-10-CM
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
## 2017-06-05 LAB — NON-GYNECOLOGICAL CYTOLOGY STUDY: SIGNIFICANT CHANGE UP

## 2017-06-13 ENCOUNTER — EMERGENCY (EMERGENCY)
Facility: HOSPITAL | Age: 37
LOS: 1 days | Discharge: HOME | End: 2017-06-13
Attending: EMERGENCY MEDICINE
Payer: MEDICAID

## 2017-06-13 VITALS
HEART RATE: 99 BPM | DIASTOLIC BLOOD PRESSURE: 109 MMHG | TEMPERATURE: 99 F | RESPIRATION RATE: 22 BRPM | OXYGEN SATURATION: 99 % | SYSTOLIC BLOOD PRESSURE: 166 MMHG | WEIGHT: 145.06 LBS | HEIGHT: 65 IN

## 2017-06-13 LAB
ALBUMIN SERPL ELPH-MCNC: 3.5 G/DL — SIGNIFICANT CHANGE UP (ref 3.5–5)
ALP SERPL-CCNC: 105 U/L — SIGNIFICANT CHANGE UP (ref 40–120)
ALT FLD-CCNC: 72 U/L DA — HIGH (ref 10–60)
ANION GAP SERPL CALC-SCNC: 8 MMOL/L — SIGNIFICANT CHANGE UP (ref 5–17)
AST SERPL-CCNC: 27 U/L — SIGNIFICANT CHANGE UP (ref 10–40)
BILIRUB SERPL-MCNC: 0.2 MG/DL — SIGNIFICANT CHANGE UP (ref 0.2–1.2)
BUN SERPL-MCNC: 13 MG/DL — SIGNIFICANT CHANGE UP (ref 7–18)
CALCIUM SERPL-MCNC: 8.6 MG/DL — SIGNIFICANT CHANGE UP (ref 8.4–10.5)
CHLORIDE SERPL-SCNC: 108 MMOL/L — SIGNIFICANT CHANGE UP (ref 96–108)
CO2 SERPL-SCNC: 23 MMOL/L — SIGNIFICANT CHANGE UP (ref 22–31)
CREAT SERPL-MCNC: 0.75 MG/DL — SIGNIFICANT CHANGE UP (ref 0.5–1.3)
D DIMER BLD IA.RAPID-MCNC: 192 NG/ML DDU — SIGNIFICANT CHANGE UP
GLUCOSE SERPL-MCNC: 86 MG/DL — SIGNIFICANT CHANGE UP (ref 70–99)
HCT VFR BLD CALC: 41 % — SIGNIFICANT CHANGE UP (ref 39–50)
HGB BLD-MCNC: 14.1 G/DL — SIGNIFICANT CHANGE UP (ref 13–17)
MCHC RBC-ENTMCNC: 33.2 PG — SIGNIFICANT CHANGE UP (ref 27–34)
MCHC RBC-ENTMCNC: 34.3 GM/DL — SIGNIFICANT CHANGE UP (ref 32–36)
MCV RBC AUTO: 96.8 FL — SIGNIFICANT CHANGE UP (ref 80–100)
PLATELET # BLD AUTO: 241 K/UL — SIGNIFICANT CHANGE UP (ref 150–400)
POTASSIUM SERPL-MCNC: 3.8 MMOL/L — SIGNIFICANT CHANGE UP (ref 3.5–5.3)
POTASSIUM SERPL-SCNC: 3.8 MMOL/L — SIGNIFICANT CHANGE UP (ref 3.5–5.3)
PROT SERPL-MCNC: 7.8 G/DL — SIGNIFICANT CHANGE UP (ref 6–8.3)
RBC # BLD: 4.24 M/UL — SIGNIFICANT CHANGE UP (ref 4.2–5.8)
RBC # FLD: 12.2 % — SIGNIFICANT CHANGE UP (ref 10.3–14.5)
SODIUM SERPL-SCNC: 139 MMOL/L — SIGNIFICANT CHANGE UP (ref 135–145)
TROPONIN I SERPL-MCNC: <0.015 NG/ML — SIGNIFICANT CHANGE UP (ref 0–0.04)
WBC # BLD: 8.8 K/UL — SIGNIFICANT CHANGE UP (ref 3.8–10.5)
WBC # FLD AUTO: 8.8 K/UL — SIGNIFICANT CHANGE UP (ref 3.8–10.5)

## 2017-06-13 PROCEDURE — 84484 ASSAY OF TROPONIN QUANT: CPT

## 2017-06-13 PROCEDURE — 80053 COMPREHEN METABOLIC PANEL: CPT

## 2017-06-13 PROCEDURE — 85379 FIBRIN DEGRADATION QUANT: CPT

## 2017-06-13 PROCEDURE — 93005 ELECTROCARDIOGRAM TRACING: CPT

## 2017-06-13 PROCEDURE — 96374 THER/PROPH/DIAG INJ IV PUSH: CPT

## 2017-06-13 PROCEDURE — 71046 X-RAY EXAM CHEST 2 VIEWS: CPT

## 2017-06-13 PROCEDURE — 99285 EMERGENCY DEPT VISIT HI MDM: CPT

## 2017-06-13 PROCEDURE — 71020: CPT | Mod: 26

## 2017-06-13 PROCEDURE — 99284 EMERGENCY DEPT VISIT MOD MDM: CPT | Mod: 25

## 2017-06-13 PROCEDURE — 85027 COMPLETE CBC AUTOMATED: CPT

## 2017-06-13 RX ORDER — KETOROLAC TROMETHAMINE 30 MG/ML
30 SYRINGE (ML) INJECTION ONCE
Qty: 0 | Refills: 0 | Status: DISCONTINUED | OUTPATIENT
Start: 2017-06-13 | End: 2017-06-13

## 2017-06-13 RX ADMIN — Medication 30 MILLIGRAM(S): at 13:14

## 2017-06-13 RX ADMIN — Medication 30 MILLIGRAM(S): at 13:47

## 2017-06-13 NOTE — ED ADULT NURSE NOTE - OBJECTIVE STATEMENT
Presented to ED complaining of "chest pain that goes down the left arm that started today." AA&Ox3. Breathing on room air. Noted with eschar sore to left chest. Placed on cardiac monitor.

## 2017-06-13 NOTE — ED PROVIDER NOTE - MEDICAL DECISION MAKING DETAILS
35 y/o M pt presents to ED c/o difficulty breathing x last night, secondary to cough, runny nose, dry throat; likely viral. Will order labs, CXR, Toradol, anticipator guidance and reassess. 35 y/o M pt presents to ED c/o difficulty breathing x last night, assoc cough, runny nose, dry throat; likely viral. Will order labs, CXR, Toradol, anticipator guidance and reassess. labs, cxr ecg reassuring. discussed anticipatory guidance. outpt follow up. engaged coordinator to help with follow up. feels better after meds

## 2017-06-13 NOTE — ED PROVIDER NOTE - NS ED MD SCRIBE ATTENDING SCRIBE SECTIONS
VITAL SIGNS( Pullset)/HIV/HISTORY OF PRESENT ILLNESS/REVIEW OF SYSTEMS/PAST MEDICAL/SURGICAL/SOCIAL HISTORY/PHYSICAL EXAM/DISPOSITION

## 2017-06-13 NOTE — ED PROVIDER NOTE - OBJECTIVE STATEMENT
37 y/o M pt with no significant PMHx presents to ED c/o difficulty breathing x last night. Pt reports to also have cough, runny nose, subjective fever, dry throat x4 weeks. Pt currently feels weakness, but no trouble breathing. Pt denies a hx of smoking or asthma. Pt also states that he took tylenols, to no relief. Pt denies SOB, nausea, vomiting, or any other complaints. NKDA.

## 2017-06-16 DIAGNOSIS — R05 COUGH: ICD-10-CM

## 2017-06-16 DIAGNOSIS — R09.89 OTHER SPECIFIED SYMPTOMS AND SIGNS INVOLVING THE CIRCULATORY AND RESPIRATORY SYSTEMS: ICD-10-CM

## 2017-06-16 DIAGNOSIS — R07.9 CHEST PAIN, UNSPECIFIED: ICD-10-CM

## 2017-06-16 DIAGNOSIS — R06.89 OTHER ABNORMALITIES OF BREATHING: ICD-10-CM

## 2017-06-16 DIAGNOSIS — J39.2 OTHER DISEASES OF PHARYNX: ICD-10-CM

## 2017-06-16 DIAGNOSIS — R50.9 FEVER, UNSPECIFIED: ICD-10-CM

## 2017-06-21 PROBLEM — Z00.00 ENCOUNTER FOR PREVENTIVE HEALTH EXAMINATION: Status: ACTIVE | Noted: 2017-06-21

## 2017-06-27 ENCOUNTER — APPOINTMENT (OUTPATIENT)
Dept: INTERNAL MEDICINE | Facility: CLINIC | Age: 37
End: 2017-06-27

## 2017-06-27 ENCOUNTER — OUTPATIENT (OUTPATIENT)
Dept: OUTPATIENT SERVICES | Facility: HOSPITAL | Age: 37
LOS: 1 days | End: 2017-06-27
Payer: SELF-PAY

## 2017-06-27 VITALS
DIASTOLIC BLOOD PRESSURE: 89 MMHG | WEIGHT: 155 LBS | TEMPERATURE: 98.1 F | OXYGEN SATURATION: 98 % | HEIGHT: 65 IN | SYSTOLIC BLOOD PRESSURE: 142 MMHG | RESPIRATION RATE: 17 BRPM | BODY MASS INDEX: 25.83 KG/M2 | HEART RATE: 83 BPM

## 2017-06-27 DIAGNOSIS — R76.11 NONSPECIFIC REACTION TO TUBERCULIN SKIN TEST WITHOUT ACTIVE TUBERCULOSIS: ICD-10-CM

## 2017-06-27 DIAGNOSIS — Z20.1 CONTACT WITH AND (SUSPECTED) EXPOSURE TO TUBERCULOSIS: ICD-10-CM

## 2017-06-27 DIAGNOSIS — Z00.00 ENCOUNTER FOR GENERAL ADULT MEDICAL EXAMINATION WITHOUT ABNORMAL FINDINGS: ICD-10-CM

## 2017-06-27 DIAGNOSIS — Z87.891 PERSONAL HISTORY OF NICOTINE DEPENDENCE: ICD-10-CM

## 2017-06-27 DIAGNOSIS — R76.11 NONSPECIFIC REACTION TO TUBERCULIN SKIN TEST W/OUT ACTIVE TUBERCULOSIS: ICD-10-CM

## 2017-06-27 DIAGNOSIS — J93.83 OTHER PNEUMOTHORAX: ICD-10-CM

## 2017-06-27 DIAGNOSIS — Z87.09 PERSONAL HISTORY OF OTHER DISEASES OF THE RESPIRATORY SYSTEM: ICD-10-CM

## 2017-06-27 DIAGNOSIS — R76.12 NONSPECIFIC REACTION TO CELL MEDIATED IMMUNITY MEASUREMENT OF GAMMA INTERFERON ANTIGEN RESPONSE WITHOUT ACTIVE TUBERCULOSIS: ICD-10-CM

## 2017-06-27 DIAGNOSIS — Z87.898 PERSONAL HISTORY OF OTHER SPECIFIED CONDITIONS: ICD-10-CM

## 2017-06-27 DIAGNOSIS — R76.12 NONSPECIFIC REACTION TO CELL MEDIATED IMMUNITY MEASUREMENT OF GAMMA INTERFERON ANTIGEN RESPONSE W/OUT ACTIVE TUBERCULOSIS: ICD-10-CM

## 2017-06-27 PROCEDURE — G0463: CPT

## 2017-07-05 LAB
CULTURE RESULTS: SIGNIFICANT CHANGE UP
SPECIMEN SOURCE: SIGNIFICANT CHANGE UP

## 2017-07-12 LAB
CULTURE RESULTS: SIGNIFICANT CHANGE UP
SPECIMEN SOURCE: SIGNIFICANT CHANGE UP

## 2018-04-22 ENCOUNTER — EMERGENCY (EMERGENCY)
Facility: HOSPITAL | Age: 38
LOS: 1 days | Discharge: HOME | End: 2018-04-22
Attending: EMERGENCY MEDICINE
Payer: MEDICAID

## 2018-04-22 VITALS
RESPIRATION RATE: 19 BRPM | DIASTOLIC BLOOD PRESSURE: 94 MMHG | OXYGEN SATURATION: 96 % | TEMPERATURE: 98 F | SYSTOLIC BLOOD PRESSURE: 143 MMHG | HEART RATE: 93 BPM

## 2018-04-22 PROCEDURE — 71046 X-RAY EXAM CHEST 2 VIEWS: CPT

## 2018-04-22 PROCEDURE — 99283 EMERGENCY DEPT VISIT LOW MDM: CPT

## 2018-04-22 PROCEDURE — 93005 ELECTROCARDIOGRAM TRACING: CPT

## 2018-04-22 PROCEDURE — 99283 EMERGENCY DEPT VISIT LOW MDM: CPT | Mod: 25

## 2018-04-22 PROCEDURE — 71046 X-RAY EXAM CHEST 2 VIEWS: CPT | Mod: 26

## 2018-04-22 RX ORDER — IBUPROFEN 200 MG
1 TABLET ORAL
Qty: 28 | Refills: 0 | OUTPATIENT
Start: 2018-04-22 | End: 2018-04-28

## 2018-04-22 RX ORDER — IBUPROFEN 200 MG
600 TABLET ORAL ONCE
Qty: 0 | Refills: 0 | Status: COMPLETED | OUTPATIENT
Start: 2018-04-22 | End: 2018-04-22

## 2018-04-22 RX ADMIN — Medication 600 MILLIGRAM(S): at 15:29
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
## 2018-04-22 NOTE — ED ADULT NURSE NOTE - OBJECTIVE STATEMENT
Pt AOx3, ambulatory, c/o left scapula pain radiating to left chest and difficulty breathing since yesterday. Pt denies n/v/abdominal pain. No changes in LOC, vision, dizziness

## 2018-04-26 ENCOUNTER — INPATIENT (INPATIENT)
Facility: HOSPITAL | Age: 38
LOS: 0 days | Discharge: HOME | DRG: 313 | End: 2018-04-27
Attending: INTERNAL MEDICINE | Admitting: INTERNAL MEDICINE
Payer: MEDICAID

## 2018-04-26 VITALS
SYSTOLIC BLOOD PRESSURE: 132 MMHG | HEIGHT: 60 IN | OXYGEN SATURATION: 98 % | DIASTOLIC BLOOD PRESSURE: 92 MMHG | HEART RATE: 95 BPM | TEMPERATURE: 99 F | WEIGHT: 156.97 LBS | RESPIRATION RATE: 18 BRPM

## 2018-04-26 DIAGNOSIS — Z29.9 ENCOUNTER FOR PROPHYLACTIC MEASURES, UNSPECIFIED: ICD-10-CM

## 2018-04-26 DIAGNOSIS — R07.9 CHEST PAIN, UNSPECIFIED: ICD-10-CM

## 2018-04-26 DIAGNOSIS — J98.4 OTHER DISORDERS OF LUNG: ICD-10-CM

## 2018-04-26 DIAGNOSIS — R76.11 NONSPECIFIC REACTION TO TUBERCULIN SKIN TEST WITHOUT ACTIVE TUBERCULOSIS: ICD-10-CM

## 2018-04-26 LAB
ALBUMIN SERPL ELPH-MCNC: 4 G/DL — SIGNIFICANT CHANGE UP (ref 3.5–5)
ALP SERPL-CCNC: 80 U/L — SIGNIFICANT CHANGE UP (ref 40–120)
ALT FLD-CCNC: 31 U/L DA — SIGNIFICANT CHANGE UP (ref 10–60)
ANION GAP SERPL CALC-SCNC: 7 MMOL/L — SIGNIFICANT CHANGE UP (ref 5–17)
AST SERPL-CCNC: 21 U/L — SIGNIFICANT CHANGE UP (ref 10–40)
BASOPHILS # BLD AUTO: 0.1 K/UL — SIGNIFICANT CHANGE UP (ref 0–0.2)
BASOPHILS NFR BLD AUTO: 0.8 % — SIGNIFICANT CHANGE UP (ref 0–2)
BILIRUB SERPL-MCNC: 0.6 MG/DL — SIGNIFICANT CHANGE UP (ref 0.2–1.2)
BUN SERPL-MCNC: 15 MG/DL — SIGNIFICANT CHANGE UP (ref 7–18)
CALCIUM SERPL-MCNC: 8.8 MG/DL — SIGNIFICANT CHANGE UP (ref 8.4–10.5)
CHLORIDE SERPL-SCNC: 109 MMOL/L — HIGH (ref 96–108)
CO2 SERPL-SCNC: 25 MMOL/L — SIGNIFICANT CHANGE UP (ref 22–31)
CREAT SERPL-MCNC: 0.88 MG/DL — SIGNIFICANT CHANGE UP (ref 0.5–1.3)
EOSINOPHIL # BLD AUTO: 0.9 K/UL — HIGH (ref 0–0.5)
EOSINOPHIL NFR BLD AUTO: 11.3 % — HIGH (ref 0–6)
GLUCOSE SERPL-MCNC: 77 MG/DL — SIGNIFICANT CHANGE UP (ref 70–99)
HCT VFR BLD CALC: 47.9 % — SIGNIFICANT CHANGE UP (ref 39–50)
HGB BLD-MCNC: 16.1 G/DL — SIGNIFICANT CHANGE UP (ref 13–17)
LYMPHOCYTES # BLD AUTO: 1.9 K/UL — SIGNIFICANT CHANGE UP (ref 1–3.3)
LYMPHOCYTES # BLD AUTO: 23 % — SIGNIFICANT CHANGE UP (ref 13–44)
MCHC RBC-ENTMCNC: 32.8 PG — SIGNIFICANT CHANGE UP (ref 27–34)
MCHC RBC-ENTMCNC: 33.6 GM/DL — SIGNIFICANT CHANGE UP (ref 32–36)
MCV RBC AUTO: 97.4 FL — SIGNIFICANT CHANGE UP (ref 80–100)
MONOCYTES # BLD AUTO: 0.4 K/UL — SIGNIFICANT CHANGE UP (ref 0–0.9)
MONOCYTES NFR BLD AUTO: 4.9 % — SIGNIFICANT CHANGE UP (ref 2–14)
NEUTROPHILS # BLD AUTO: 4.9 K/UL — SIGNIFICANT CHANGE UP (ref 1.8–7.4)
NEUTROPHILS NFR BLD AUTO: 60 % — SIGNIFICANT CHANGE UP (ref 43–77)
PLATELET # BLD AUTO: 183 K/UL — SIGNIFICANT CHANGE UP (ref 150–400)
POTASSIUM SERPL-MCNC: 3.8 MMOL/L — SIGNIFICANT CHANGE UP (ref 3.5–5.3)
POTASSIUM SERPL-SCNC: 3.8 MMOL/L — SIGNIFICANT CHANGE UP (ref 3.5–5.3)
PROT SERPL-MCNC: 7.4 G/DL — SIGNIFICANT CHANGE UP (ref 6–8.3)
RBC # BLD: 4.92 M/UL — SIGNIFICANT CHANGE UP (ref 4.2–5.8)
RBC # FLD: 11.5 % — SIGNIFICANT CHANGE UP (ref 10.3–14.5)
SODIUM SERPL-SCNC: 141 MMOL/L — SIGNIFICANT CHANGE UP (ref 135–145)
TROPONIN I SERPL-MCNC: <0.015 NG/ML — SIGNIFICANT CHANGE UP (ref 0–0.04)
WBC # BLD: 8.2 K/UL — SIGNIFICANT CHANGE UP (ref 3.8–10.5)
WBC # FLD AUTO: 8.2 K/UL — SIGNIFICANT CHANGE UP (ref 3.8–10.5)

## 2018-04-26 PROCEDURE — 71046 X-RAY EXAM CHEST 2 VIEWS: CPT | Mod: 26

## 2018-04-26 PROCEDURE — 93010 ELECTROCARDIOGRAM REPORT: CPT

## 2018-04-26 PROCEDURE — 99285 EMERGENCY DEPT VISIT HI MDM: CPT

## 2018-04-26 PROCEDURE — 71250 CT THORAX DX C-: CPT | Mod: 26

## 2018-04-26 RX ORDER — ATORVASTATIN CALCIUM 80 MG/1
40 TABLET, FILM COATED ORAL AT BEDTIME
Qty: 0 | Refills: 0 | Status: DISCONTINUED | OUTPATIENT
Start: 2018-04-26 | End: 2018-04-27

## 2018-04-26 RX ORDER — METOPROLOL TARTRATE 50 MG
12.5 TABLET ORAL
Qty: 0 | Refills: 0 | Status: DISCONTINUED | OUTPATIENT
Start: 2018-04-26 | End: 2018-04-27

## 2018-04-26 RX ORDER — PYRIDOXINE HCL (VITAMIN B6) 100 MG
100 TABLET ORAL DAILY
Qty: 0 | Refills: 0 | Status: DISCONTINUED | OUTPATIENT
Start: 2018-04-26 | End: 2018-04-27

## 2018-04-26 RX ORDER — HEXAVITAMINS
300 TABLET ORAL DAILY
Qty: 0 | Refills: 0 | Status: DISCONTINUED | OUTPATIENT
Start: 2018-04-26 | End: 2018-04-27

## 2018-04-26 RX ORDER — HEXAVITAMINS
1 TABLET ORAL
Qty: 0 | Refills: 0 | COMMUNITY

## 2018-04-26 RX ORDER — PYRIDOXINE HCL (VITAMIN B6) 100 MG
1 TABLET ORAL
Qty: 0 | Refills: 0 | COMMUNITY

## 2018-04-26 RX ORDER — ASPIRIN/CALCIUM CARB/MAGNESIUM 324 MG
81 TABLET ORAL DAILY
Qty: 0 | Refills: 0 | Status: DISCONTINUED | OUTPATIENT
Start: 2018-04-26 | End: 2018-04-27

## 2018-04-26 RX ADMIN — ATORVASTATIN CALCIUM 40 MILLIGRAM(S): 80 TABLET, FILM COATED ORAL at 21:37

## 2018-04-26 NOTE — ED ADULT NURSE NOTE - OBJECTIVE STATEMENT
Patient came to the ED a/o x 3 ambulates c/o right sided chest pain radiating to the back. Pt has no c/o dizziness, or SOB noted.

## 2018-04-26 NOTE — ED PROVIDER NOTE - PROGRESS NOTE DETAILS
CXR from today (and also 2 days ago, seen in ED) shows b/l upper lung nodular densities, with radiology recommending CT, so will order. I spoke with Pt's PMD/pulmonologist, Dr. Anton Barillas, and he advises admitting to his service given the CT chest result showing cavitary lesion, chest discomfort, and on previous admission positive quantiferon TB test.  Labs ordered for admission.  Will notify MAR

## 2018-04-26 NOTE — H&P ADULT - ASSESSMENT
37 yoM with PMH pneumothorax, B/L cavitary lung lesion admitted to [facility] in june, 2017- 2xAFB negative, bronchoscopy and biopsy negative at that time, latent TB on isoniazide presented to ED on 4/24 with complaints of chest pain.

## 2018-04-26 NOTE — ED PROVIDER NOTE - MUSCULOSKELETAL, MLM
Spine appears normal, range of motion is not limited, no muscle or joint tenderness. No leg tenderness or swelling.

## 2018-04-26 NOTE — ED PROVIDER NOTE - MEDICAL DECISION MAKING DETAILS
36 y/o M pt with Hx of pneumothorax in the past presents with chest and upper back pressure. Will check CXR, EKG and reevaluate.
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
## 2018-04-26 NOTE — H&P ADULT - PROBLEM SELECTOR PLAN 3
continue  isoniazide and pyridoxine   PPD and quantiferon positive on last admission   AFBx3 negative

## 2018-04-26 NOTE — H&P ADULT - NSHPLABSRESULTS_GEN_ALL_CORE
Vital Signs Last 24 Hrs  T(C): 37.1 (26 Apr 2018 09:39), Max: 37.1 (26 Apr 2018 09:39)  T(F): 98.8 (26 Apr 2018 09:39), Max: 98.8 (26 Apr 2018 09:39)  HR: 95 (26 Apr 2018 09:39) (95 - 95)  BP: 132/92 (26 Apr 2018 09:39) (132/92 - 132/92)  BP(mean): --  RR: 18 (26 Apr 2018 09:39) (18 - 18)  SpO2: 98% (26 Apr 2018 09:39) (98% - 98%)                16.1   8.2   )-----------( 183      ( 26 Apr 2018 16:18 )             47.9       04-26    141  |  109<H>  |  15  ----------------------------<  77  3.8   |  25  |  0.88    Ca    8.8      26 Apr 2018 16:18    TPro  7.4  /  Alb  4.0  /  TBili  0.6  /  DBili  x   /  AST  21  /  ALT  31  /  AlkPhos  80  04-26            CARDIAC MARKERS ( 26 Apr 2018 16:18 )  <0.015 ng/mL / x     / x     / x     / x        < from: CT Chest No Cont (04.26.18 @ 12:16) >    IMPRESSION:    When compared with CT dated May 20, 2017: Bilateral upper lobe nodular   densities are again seen, with the right-sided density now containing   minimal central cavitation. This nodule appears smaller in size when   compared with the prior study. Left upper lobe nodule appears unchanged   in size with adjacent satellite nodules and groundglass opacity.   Differential includes infection (including tuberculosis), sarcoid, or   granulomas related to occupational lung disease.      < end of copied text >    < from: Xray Chest 2 Views PA/Lat (04.26.18 @ 10:48) >    Impression: No evidence for pulmonary consolidation, pleural effusion or   pneumothorax.     Grossly stable nodular densities in the lung apices bilaterally. Again if   clinically indicated, chest CT without IV contrast may be pursued for   further evaluation. This finding is communicated with the emergency   department via the PACS communication system.    The trachea is midline.    The cardiac silhouette is within normal limits.        < end of copied text >

## 2018-04-26 NOTE — H&P ADULT - PROBLEM SELECTOR PLAN 2
work up done last year in june - bronchoscopy and biopsy negative   CT similar lesion as last year   no need for further work up

## 2018-04-26 NOTE — ED PROVIDER NOTE - OBJECTIVE STATEMENT
38 y/o M pt with a significant PMHx of pneumothorax and no significant PSHx presents to the ED c/o chest pressure and upper back discomfort x10 days. Pt reports he was successfully treated for pneumothorax in the past. Pt denies SOB, fever, chills, cough, vomiting, abd pain, injury, drug use or any other complaints. Former smoker. NKDA.

## 2018-04-26 NOTE — H&P ADULT - PROBLEM SELECTOR PLAN 1
atypical chest pain, less likely cardiac origin   will rule out ACS as patient does not have cardiac work up  T1 negative follow T3 and T4   will start on aspirin, statin and metoprolol

## 2018-04-26 NOTE — H&P ADULT - NSHPSOCIALHISTORY_GEN_ALL_CORE
denies use of smoking and use of recreational drugs   drinks alcohol occasionally  lives with friend.

## 2018-04-27 ENCOUNTER — TRANSCRIPTION ENCOUNTER (OUTPATIENT)
Age: 38
End: 2018-04-27

## 2018-04-27 VITALS
HEART RATE: 72 BPM | DIASTOLIC BLOOD PRESSURE: 93 MMHG | OXYGEN SATURATION: 100 % | TEMPERATURE: 98 F | SYSTOLIC BLOOD PRESSURE: 126 MMHG | RESPIRATION RATE: 16 BRPM

## 2018-04-27 LAB
24R-OH-CALCIDIOL SERPL-MCNC: 18.1 NG/ML — LOW (ref 30–80)
ANION GAP SERPL CALC-SCNC: 5 MMOL/L — SIGNIFICANT CHANGE UP (ref 5–17)
BASOPHILS # BLD AUTO: 0.1 K/UL — SIGNIFICANT CHANGE UP (ref 0–0.2)
BASOPHILS NFR BLD AUTO: 1 % — SIGNIFICANT CHANGE UP (ref 0–2)
BUN SERPL-MCNC: 20 MG/DL — HIGH (ref 7–18)
CALCIUM SERPL-MCNC: 8.3 MG/DL — LOW (ref 8.4–10.5)
CHLORIDE SERPL-SCNC: 108 MMOL/L — SIGNIFICANT CHANGE UP (ref 96–108)
CHOLEST SERPL-MCNC: 161 MG/DL — SIGNIFICANT CHANGE UP (ref 10–199)
CK MB BLD-MCNC: <0.9 % — SIGNIFICANT CHANGE UP (ref 0–3.5)
CK MB BLD-MCNC: <1.1 % — SIGNIFICANT CHANGE UP (ref 0–3.5)
CK MB CFR SERPL CALC: <1 NG/ML — SIGNIFICANT CHANGE UP (ref 0–3.6)
CK MB CFR SERPL CALC: <1 NG/ML — SIGNIFICANT CHANGE UP (ref 0–3.6)
CK SERPL-CCNC: 116 U/L — SIGNIFICANT CHANGE UP (ref 35–232)
CK SERPL-CCNC: 92 U/L — SIGNIFICANT CHANGE UP (ref 35–232)
CO2 SERPL-SCNC: 27 MMOL/L — SIGNIFICANT CHANGE UP (ref 22–31)
CREAT SERPL-MCNC: 0.8 MG/DL — SIGNIFICANT CHANGE UP (ref 0.5–1.3)
EOSINOPHIL # BLD AUTO: 1 K/UL — HIGH (ref 0–0.5)
EOSINOPHIL NFR BLD AUTO: 11.4 % — HIGH (ref 0–6)
FOLATE SERPL-MCNC: 8 NG/ML — SIGNIFICANT CHANGE UP
GLUCOSE SERPL-MCNC: 80 MG/DL — SIGNIFICANT CHANGE UP (ref 70–99)
HBA1C BLD-MCNC: 5.1 % — SIGNIFICANT CHANGE UP (ref 4–5.6)
HCT VFR BLD CALC: 44.6 % — SIGNIFICANT CHANGE UP (ref 39–50)
HDLC SERPL-MCNC: 33 MG/DL — LOW (ref 40–125)
HGB BLD-MCNC: 14.8 G/DL — SIGNIFICANT CHANGE UP (ref 13–17)
IGE SERPL-ACNC: 1812 IU/ML — HIGH (ref 0–100)
LIPID PNL WITH DIRECT LDL SERPL: 104 MG/DL — SIGNIFICANT CHANGE UP
LYMPHOCYTES # BLD AUTO: 2.3 K/UL — SIGNIFICANT CHANGE UP (ref 1–3.3)
LYMPHOCYTES # BLD AUTO: 27.8 % — SIGNIFICANT CHANGE UP (ref 13–44)
MAGNESIUM SERPL-MCNC: 2.2 MG/DL — SIGNIFICANT CHANGE UP (ref 1.6–2.6)
MCHC RBC-ENTMCNC: 32.8 PG — SIGNIFICANT CHANGE UP (ref 27–34)
MCHC RBC-ENTMCNC: 33.1 GM/DL — SIGNIFICANT CHANGE UP (ref 32–36)
MCV RBC AUTO: 99 FL — SIGNIFICANT CHANGE UP (ref 80–100)
MONOCYTES # BLD AUTO: 0.5 K/UL — SIGNIFICANT CHANGE UP (ref 0–0.9)
MONOCYTES NFR BLD AUTO: 6 % — SIGNIFICANT CHANGE UP (ref 2–14)
NEUTROPHILS # BLD AUTO: 4.5 K/UL — SIGNIFICANT CHANGE UP (ref 1.8–7.4)
NEUTROPHILS NFR BLD AUTO: 53.8 % — SIGNIFICANT CHANGE UP (ref 43–77)
PHOSPHATE SERPL-MCNC: 3.2 MG/DL — SIGNIFICANT CHANGE UP (ref 2.5–4.5)
PLATELET # BLD AUTO: 170 K/UL — SIGNIFICANT CHANGE UP (ref 150–400)
POTASSIUM SERPL-MCNC: 4 MMOL/L — SIGNIFICANT CHANGE UP (ref 3.5–5.3)
POTASSIUM SERPL-SCNC: 4 MMOL/L — SIGNIFICANT CHANGE UP (ref 3.5–5.3)
RBC # BLD: 4.51 M/UL — SIGNIFICANT CHANGE UP (ref 4.2–5.8)
RBC # FLD: 12 % — SIGNIFICANT CHANGE UP (ref 10.3–14.5)
SODIUM SERPL-SCNC: 140 MMOL/L — SIGNIFICANT CHANGE UP (ref 135–145)
TOTAL CHOLESTEROL/HDL RATIO MEASUREMENT: 4.9 RATIO — SIGNIFICANT CHANGE UP (ref 3.4–9.6)
TRIGL SERPL-MCNC: 121 MG/DL — SIGNIFICANT CHANGE UP (ref 10–149)
TROPONIN I SERPL-MCNC: <0.015 NG/ML — SIGNIFICANT CHANGE UP (ref 0–0.04)
TROPONIN I SERPL-MCNC: <0.015 NG/ML — SIGNIFICANT CHANGE UP (ref 0–0.04)
TSH SERPL-MCNC: 2.55 UU/ML — SIGNIFICANT CHANGE UP (ref 0.34–4.82)
VIT B12 SERPL-MCNC: 500 PG/ML — SIGNIFICANT CHANGE UP (ref 232–1245)
VIT D25+D1,25 OH+D1,25 PNL SERPL-MCNC: 35.1 PG/ML — SIGNIFICANT CHANGE UP (ref 19.9–79.3)
WBC # BLD: 8.4 K/UL — SIGNIFICANT CHANGE UP (ref 3.8–10.5)
WBC # FLD AUTO: 8.4 K/UL — SIGNIFICANT CHANGE UP (ref 3.8–10.5)

## 2018-04-27 PROCEDURE — 93306 TTE W/DOPPLER COMPLETE: CPT | Mod: 26

## 2018-04-27 RX ORDER — ERGOCALCIFEROL 1.25 MG/1
50000 CAPSULE ORAL ONCE
Qty: 0 | Refills: 0 | Status: COMPLETED | OUTPATIENT
Start: 2018-04-27 | End: 2018-04-27

## 2018-04-27 RX ADMIN — Medication 81 MILLIGRAM(S): at 12:39

## 2018-04-27 RX ADMIN — Medication 300 MILLIGRAM(S): at 12:39

## 2018-04-27 RX ADMIN — Medication 12.5 MILLIGRAM(S): at 05:47

## 2018-04-27 RX ADMIN — Medication 100 MILLIGRAM(S): at 12:39

## 2018-04-27 RX ADMIN — ERGOCALCIFEROL 50000 UNIT(S): 1.25 CAPSULE ORAL at 17:39

## 2018-04-27 RX ADMIN — Medication 12.5 MILLIGRAM(S): at 17:39

## 2018-04-27 NOTE — PROGRESS NOTE ADULT - SUBJECTIVE AND OBJECTIVE BOX
37 yoM with PMH pneumothorax, B/L cavitary lung lesion admitted to [facility] in june, 2017- 2xAFB negative, bronchoscopy and biopsy negative at that time, latent TB on isoniazide presented to ED on 4/24 with complaints of chest pain discharged from ED came back as chest pain did not improved with pain medications. Chest pain is sternal radiating to left arm and left back , 3/10 in intensity, burning in nature, not associated with movement, exercise, cough. reported occasional cough during winter months which improved with out medications. Reports eating spicy food but this pain is different then pain he usually have after spicy food. Denies fever, N/V, diarrhea, abdominal pain. Denies any sick contacts, any recent travel (after work up last year).    Review of Systems:  Other Review of Systems: All other review of systems negative, except as noted in HPI	      pt seen in tele [x  ], reg med floor [   ], bed [ x ], chair at bedside [   ], a+o x3 [x  ], lethargic [  ],  nad [x  ]        Allergies    No Known Allergies        Vitals    T(F): 98.4 (04-27-18 @ 07:40), Max: 98.6 (04-26-18 @ 20:42)  HR: 65 (04-27-18 @ 07:40) (65 - 71)  BP: 105/69 (04-27-18 @ 07:40) (105/69 - 128/85)  RR: 16 (04-27-18 @ 07:40) (15 - 16)  SpO2: 98% (04-27-18 @ 07:40) (96% - 99%)  Wt(kg): --  CAPILLARY BLOOD GLUCOSE          Labs                          14.8   8.4   )-----------( 170      ( 27 Apr 2018 06:16 )             44.6       04-27    140  |  108  |  20<H>  ----------------------------<  80  4.0   |  27  |  0.80    Ca    8.3<L>      27 Apr 2018 06:16  Phos  3.2     04-27  Mg     2.2     04-27    TPro  7.4  /  Alb  4.0  /  TBili  0.6  /  DBili  x   /  AST  21  /  ALT  31  /  AlkPhos  80  04-26      CARDIAC MARKERS ( 27 Apr 2018 06:16 )  <0.015 ng/mL / x     / 116 U/L / x     / <1.0 ng/mL  CARDIAC MARKERS ( 26 Apr 2018 23:53 )  <0.015 ng/mL / x     / 92 U/L / x     / <1.0 ng/mL  CARDIAC MARKERS ( 26 Apr 2018 16:18 )  <0.015 ng/mL / x     / x     / x     / x                Radiology Results      Meds    MEDICATIONS  (STANDING):  aspirin enteric coated 81 milliGRAM(s) Oral daily  atorvastatin 40 milliGRAM(s) Oral at bedtime  isoniazid 300 milliGRAM(s) Oral daily  metoprolol tartrate 12.5 milliGRAM(s) Oral two times a day  pyridoxine 100 milliGRAM(s) Oral daily      MEDICATIONS  (PRN):      Physical Exam    Neuro :  no focal deficits  Respiratory: CTA B/L  CV: RRR, S1S2, no murmurs,   Abdominal: Soft, NT, ND +BS,  Extremities: No edema, + peripheral pulses    ASSESSMENT    chest pain  r/o acs  Pneumothorax  latent tb        PLAN    cont tele,   acs protocol,   cont lopressor, aspirin, statin,   ce q8 x3 neg noted above  cardio cons noted  f/u echo  f/u stress test  pulm cons  cont current meds  d/c plan if stress test neg

## 2018-04-27 NOTE — DISCHARGE NOTE ADULT - PLAN OF CARE
Please schedule a close follow up with your PCP in 1 week You presented with CP and were admitted possible acute coronary syndrome.  Cardiac enzymes were neg.  Echocardiogram was significant for Lt ventricular dysfunction, and Stress test negative for wall ischemia.  Please schedule a close follow up with your PCP within 1 week of discharge. Please continue current medication management You presented with CP and were admitted possible acute coronary syndrome.  Cardiac enzymes were neg.  Echocardiogram was significant for Lt ventricular dysfunction, and Stress test negative for wall ischemia.  You have an appt with [facility] on 4/30 at 1pm for follow up. Please continue current  medication regimen and follow up with [facility] for follow up.

## 2018-04-27 NOTE — CONSULT NOTE ADULT - SUBJECTIVE AND OBJECTIVE BOX
CHIEF COMPLAINT:Patient is a 37y old  Male who presents with a chief complaint of chest pain for last 10 days.       HPI:  37 yr old Male with PMHX of  pneumothorax, B/L cavitary lung lesion admitted to [facility] in june, 2017- 2xAFB negative, bronchoscopy and biopsy negative at that time, latent TB on isoniazide presented to ED on 4/24 with complaints of chest pain discharged from ED came back as chest pain did not improved with pain medications. Chest pain is sternal radiating to left arm and left back , 3/10 in intensity, burning in nature, not associated with movement, exercise, cough. reported occasional cough during winter months which improved with out medications. Reports eating spicy food but this pain is different then pain he usually have after spicy food. Denies fever, N/V, diarrhea, abdominal pain. Denies any sick contacts, any recent travel (after work up last year). (26 Apr 2018 19:37)      PAST MEDICAL & SURGICAL HISTORY:  Pneumothorax  Latent TB    MEDICATIONS  (STANDING):  aspirin enteric coated 81 milliGRAM(s) Oral daily  atorvastatin 40 milliGRAM(s) Oral at bedtime  isoniazid 300 milliGRAM(s) Oral daily  metoprolol tartrate 12.5 milliGRAM(s) Oral two times a day  pyridoxine 100 milliGRAM(s) Oral daily    MEDICATIONS  (PRN):      FAMILY HISTORY:  Family history of hypertension in mother (Mother)      SOCIAL HISTORY:    [x ] Non-smoker    [x ] Alcohol-denies    Allergies    No Known Allergies    Intolerances    	    REVIEW OF SYSTEMS:  CONSTITUTIONAL: No fever, weight loss, or fatigue  EYES: No eye pain, visual disturbances, or discharge  ENT:  No difficulty hearing, tinnitus, vertigo; No sinus or throat pain  NECK: No pain or stiffness  RESPIRATORY: No cough, wheezing, chills or hemoptysis; No Shortness of Breath  CARDIOVASCULAR: + chest pain, No  palpitations, passing out, dizziness, or leg swelling  GASTROINTESTINAL: No abdominal or epigastric pain. No nausea, vomiting, or hematemesis; No diarrhea or constipation. No melena or hematochezia.  GENITOURINARY: No dysuria, frequency, hematuria, or incontinence  NEUROLOGICAL: No headaches, memory loss, loss of strength, numbness, or tremors  SKIN: No itching, burning, rashes, or lesions   LYMPH Nodes: No enlarged glands  ENDOCRINE: No heat or cold intolerance; No hair loss  MUSCULOSKELETAL: No joint pain or swelling; No muscle, back, or extremity pain  PSYCHIATRIC: No depression, anxiety, mood swings, or difficulty sleeping  HEME/LYMPH: No easy bruising, or bleeding gums  ALLERGY AND IMMUNOLOGIC: No hives or eczema	      PHYSICAL EXAM:  T(C): 36.9 (04-27-18 @ 07:40), Max: 37.1 (04-26-18 @ 09:39)  HR: 65 (04-27-18 @ 07:40) (65 - 95)  BP: 105/69 (04-27-18 @ 07:40) (105/69 - 132/92)  RR: 16 (04-27-18 @ 07:40) (15 - 18)  SpO2: 98% (04-27-18 @ 07:40) (96% - 99%)      Appearance: Normal	  HEENT:   Normal oral mucosa, PERRL, EOMI	  Lymphatic: No lymphadenopathy  Cardiovascular: Normal S1 S2, No JVD, No murmurs, No edema  Respiratory: Lungs clear to auscultation	  Psychiatry: A & O x 3, Mood & affect appropriate  Gastrointestinal:  Soft, Non-tender, + BS	  Skin: No rashes, No ecchymoses, No cyanosis	  Neurologic: Non-focal  Extremities: Normal range of motion, No clubbing, cyanosis or edema  Vascular: Peripheral pulses palpable 2+ bilaterally      ECG:  nsr,nl axis	  	  LABS:	 	    CARDIAC MARKERS:  CARDIAC MARKERS ( 26 Apr 2018 23:53 )  <0.015 ng/mL / x     / 92 U/L / x     / <1.0 ng/mL  CARDIAC MARKERS ( 26 Apr 2018 16:18 )  <0.015 ng/mL / x     / x     / x     / x                           14.8   8.4   )-----------( 170      ( 27 Apr 2018 06:16 )             44.6     04-27    140  |  108  |  20<H>  ----------------------------<  80  4.0   |  27  |  0.80    Ca    8.3<L>      27 Apr 2018 06:16  Phos  3.2     04-27  Mg     2.2     04-27    TPro  7.4  /  Alb  4.0  /  TBili  0.6  /  DBili  x   /  AST  21  /  ALT  31  /  AlkPhos  80  04-26      Lipid Profile: Cholesterol 161    HDL 33        TSH: Thyroid Stimulating Hormone, Serum: 2.55 uU/mL (04-27 @ 06:16)          CT chest:    IMPRESSION:    When compared with CT dated May 20, 2017: Bilateral upper lobe nodular   densities are again seen, with the right-sided density now containing   minimal central cavitation. This nodule appears smaller in size when   compared with the prior study. Left upper lobe nodule appears unchanged   in size with adjacent satellite nodules and groundglass opacity.   Differential includes infection (including tuberculosis), sarcoid, or   granulomas related to occupational lung disease.

## 2018-04-27 NOTE — CONSULT NOTE ADULT - ASSESSMENT
37 yr old Male with PMHX of  pneumothorax, B/L cavitary lung lesion admitted to [facility] in june, 2017- 2xAFB negative, bronchoscopy and biopsy negative at that time, latent TB on isoniazide presented to ED on 4/24 with complaints of chest pain discharged from ED came back as chest pain .  1.Tele monitoring.  2.Echocardiogram.  3.Stress test-R/O ischemia.  4.Latent TB-Isoniazid.  5.Cont asa,b blocker,statin.  6.GI and DVT prophylaxis.

## 2018-04-27 NOTE — DISCHARGE NOTE ADULT - PATIENT PORTAL LINK FT
You can access the [facility] Patient Portal, offered by [facility], by registering with the following website: [email or web address]

## 2018-04-27 NOTE — DISCHARGE NOTE ADULT - CARE PLAN
Principal Discharge DX:	Chest pain, unspecified type  Goal:	Please schedule a close follow up with your PCP in 1 week  Assessment and plan of treatment:	You presented with CP and were admitted possible acute coronary syndrome.  Cardiac enzymes were neg.  Echocardiogram was significant for Lt ventricular dysfunction, and Stress test negative for wall ischemia.  Please schedule a close follow up with your PCP within 1 week of discharge.  Secondary Diagnosis:	Latent tuberculosis  Goal:	Please continue current medication management Principal Discharge DX:	Chest pain, unspecified type  Goal:	Please schedule a close follow up with your PCP in 1 week  Assessment and plan of treatment:	You presented with CP and were admitted possible acute coronary syndrome.  Cardiac enzymes were neg.  Echocardiogram was significant for Lt ventricular dysfunction, and Stress test negative for wall ischemia.  You have an appt with [facility] on 4/30 at 1pm for follow up.  Secondary Diagnosis:	Latent tuberculosis  Goal:	Please continue current medication management  Assessment and plan of treatment:	Please continue current  medication regimen and follow up with [facility] for follow up.

## 2018-04-27 NOTE — DISCHARGE NOTE ADULT - MEDICATION SUMMARY - MEDICATIONS TO TAKE
I will START or STAY ON the medications listed below when I get home from the hospital:    isoniazid 300 mg oral tablet  -- 1 tab(s) by mouth once a day  -- Indication: For Latent tuberculosis    pyridoxine 100 mg oral tablet  -- 1 tab(s) by mouth once a day  -- Indication: For Latent tuberculosis

## 2018-04-27 NOTE — PROGRESS NOTE ADULT - SUBJECTIVE AND OBJECTIVE BOX
Patient is a 37y old  Male who presents with a chief complaint of chest pain for last 10 days. (26 Apr 2018 19:37)    History of Present Illness:  Reason for Admission: chest pain for last 10 days.	  History of Present Illness: 	  37 yoM with PMH pneumothorax, B/L cavitary lung lesion admitted to [facility] in june, 2017- 2xAFB negative, bronchoscopy and biopsy negative at that time, latent TB on isoniazide presented to ED on 4/24 with complaints of chest pain discharged from ED came back as chest pain did not improved with pain medications. Chest pain is sternal radiating to left arm and left back , 3/10 in intensity, burning in nature, not associated with movement, exercise, cough. reported occasional cough during winter months which improved with out medications. Reports eating spicy food but this pain is different then pain he usually have after spicy food. Denies fever, N/V, diarrhea, abdominal pain. Denies any sick contacts, any recent travel (after work up last year).    INTERVAL HPI/OVERNIGHT EVENTS: Awake,a lert, comfortable in bed in NAD      VITAL SIGNS:  T(F): 98.6 (04-27-18 @ 11:07)  HR: 60 (04-27-18 @ 11:07)  BP: 119/78 (04-27-18 @ 11:07)  RR: 16 (04-27-18 @ 11:07)  SpO2: 98% (04-27-18 @ 11:07)  Wt(kg): --  I&O's Detail          REVIEW OF SYSTEMS:    CONSTITUTIONAL:  No fevers, chills, sweats    HEENT:  Eyes:  No diplopia or blurred vision. ENT:  No earache, sore throat or runny nose.    CARDIOVASCULAR: +chest pain    RESPIRATORY:  Per HPI    GASTROINTESTINAL:  No abdominal pain, nausea, vomiting or diarrhea.    GENITOURINARY:  No dysuria, frequency or urgency.    NEUROLOGIC:  No paresthesias, fasciculations, seizures or weakness.    PSYCHIATRIC:  No disorder of thought or mood.      PHYSICAL EXAM:    Constitutional: Well developed and nourished  Eyes:Perrla  ENMT: normal  Neck:supple  Respiratory: good air entry  Cardiovascular: S1 S2 regular  Gastrointestinal: Soft, Non tender  Extremities: No edema  Vascular:normal  Neurological:Awake, alert,Ox3  Musculoskeletal:Normal      MEDICATIONS  (STANDING):  aspirin enteric coated 81 milliGRAM(s) Oral daily  atorvastatin 40 milliGRAM(s) Oral at bedtime  isoniazid 300 milliGRAM(s) Oral daily  metoprolol tartrate 12.5 milliGRAM(s) Oral two times a day  pyridoxine 100 milliGRAM(s) Oral daily    MEDICATIONS  (PRN):      Allergies    No Known Allergies    Intolerances        LABS:                        14.8   8.4   )-----------( 170      ( 27 Apr 2018 06:16 )             44.6     04-27    140  |  108  |  20<H>  ----------------------------<  80  4.0   |  27  |  0.80    Ca    8.3<L>      27 Apr 2018 06:16  Phos  3.2     04-27  Mg     2.2     04-27    TPro  7.4  /  Alb  4.0  /  TBili  0.6  /  DBili  x   /  AST  21  /  ALT  31  /  AlkPhos  80  04-26          CARDIAC MARKERS ( 27 Apr 2018 06:16 )  <0.015 ng/mL / x     / 116 U/L / x     / <1.0 ng/mL  CARDIAC MARKERS ( 26 Apr 2018 23:53 )  <0.015 ng/mL / x     / 92 U/L / x     / <1.0 ng/mL  CARDIAC MARKERS ( 26 Apr 2018 16:18 )  <0.015 ng/mL / x     / x     / x     / x          CAPILLARY BLOOD GLUCOSE            RADIOLOGY & ADDITIONAL TESTS:    CXR:    Ct scan chest:  < from: CT Chest No Cont (04.26.18 @ 12:16) >    When compared with CT dated May 20, 2017: Bilateral upper lobe nodular   densities are again seen, with the right-sided density now containing   minimal central cavitation. This nodule appears smaller in size when   compared with the prior study. Left upper lobe nodule appears unchanged   in size with adjacent satellite nodules and groundglass opacity.   Differential includes infection (including tuberculosis), sarcoid, or   granulomas related to occupational lung disease.      < end of copied text >    ekg;    echo:

## 2018-04-29 LAB
M TB TUBERC IFN-G BLD QL: 0.04 IU/ML — SIGNIFICANT CHANGE UP
M TB TUBERC IFN-G BLD QL: 3.5 IU/ML — SIGNIFICANT CHANGE UP
M TB TUBERC IFN-G BLD QL: POSITIVE
MITOGEN IGNF BCKGRD COR BLD-ACNC: >10 IU/ML — SIGNIFICANT CHANGE UP

## 2018-04-30 LAB
A FLAVUS AB FLD QL: NEGATIVE — SIGNIFICANT CHANGE UP
A NIGER AB FLD QL: NEGATIVE — SIGNIFICANT CHANGE UP
A NIGER AB FLD QL: NEGATIVE — SIGNIFICANT CHANGE UP

## 2018-05-02 LAB
ASPERGILLUS FLAVUS PRECIPITINS: NEGATIVE — SIGNIFICANT CHANGE UP
ASPERGILLUS NIGER PRECIPITINS: NEGATIVE — SIGNIFICANT CHANGE UP
DEPRECATED A FUMIGATUS IGG RAST QL: NEGATIVE — SIGNIFICANT CHANGE UP

## 2018-05-23 PROCEDURE — 82652 VIT D 1 25-DIHYDROXY: CPT

## 2018-05-23 PROCEDURE — 82607 VITAMIN B-12: CPT

## 2018-05-23 PROCEDURE — 83735 ASSAY OF MAGNESIUM: CPT

## 2018-05-23 PROCEDURE — 84100 ASSAY OF PHOSPHORUS: CPT

## 2018-05-23 PROCEDURE — A9502: CPT

## 2018-05-23 PROCEDURE — 93005 ELECTROCARDIOGRAM TRACING: CPT

## 2018-05-23 PROCEDURE — 82550 ASSAY OF CK (CPK): CPT

## 2018-05-23 PROCEDURE — 86606 ASPERGILLUS ANTIBODY: CPT

## 2018-05-23 PROCEDURE — 71046 X-RAY EXAM CHEST 2 VIEWS: CPT

## 2018-05-23 PROCEDURE — 84484 ASSAY OF TROPONIN QUANT: CPT

## 2018-05-23 PROCEDURE — 86480 TB TEST CELL IMMUN MEASURE: CPT

## 2018-05-23 PROCEDURE — 80048 BASIC METABOLIC PNL TOTAL CA: CPT

## 2018-05-23 PROCEDURE — 80053 COMPREHEN METABOLIC PANEL: CPT

## 2018-05-23 PROCEDURE — 71250 CT THORAX DX C-: CPT

## 2018-05-23 PROCEDURE — 82746 ASSAY OF FOLIC ACID SERUM: CPT

## 2018-05-23 PROCEDURE — 85027 COMPLETE CBC AUTOMATED: CPT

## 2018-05-23 PROCEDURE — 80061 LIPID PANEL: CPT

## 2018-05-23 PROCEDURE — 99285 EMERGENCY DEPT VISIT HI MDM: CPT | Mod: 25

## 2018-05-23 PROCEDURE — 82306 VITAMIN D 25 HYDROXY: CPT

## 2018-05-23 PROCEDURE — 83036 HEMOGLOBIN GLYCOSYLATED A1C: CPT

## 2018-05-23 PROCEDURE — 82553 CREATINE MB FRACTION: CPT

## 2018-05-23 PROCEDURE — 84443 ASSAY THYROID STIM HORMONE: CPT

## 2018-05-23 PROCEDURE — 93306 TTE W/DOPPLER COMPLETE: CPT

## 2018-05-23 PROCEDURE — 93017 CV STRESS TEST TRACING ONLY: CPT

## 2018-05-23 PROCEDURE — 82785 ASSAY OF IGE: CPT

## 2018-05-23 PROCEDURE — 78452 HT MUSCLE IMAGE SPECT MULT: CPT

## 2018-10-24 NOTE — ED PROVIDER NOTE - TIMING
[facility] Outpatient / Observation Unit  Discharge Summary        Ayana Prasad MRN# [id number]   YOB: 1989 Age: 29 year old     Date of Admission:  10/22/2018  Date of Discharge:  10/24/2018  Admitting Physician:  Jackson Patel MD  Discharge Physician: Little Duckworth PA-C  Discharging Service: Hospitalist      Primary Provider: Becki Avery  Primary Care Physician Phone Number: [phone]         Primary Discharge Diagnoses:    Ayana Prasad is a 29 year old female with a PMH significant for bipolar disorder, schizoaffective disorder, polysubstance abuse and recurrent kidney stones and had a stent removed 1 week ago who was admitted on 10/22/2018 for intractable left sided renal colic 2/2 7 mm left ureteral stone.     1. Intractable renal colic 2/2 left ureteral stone: progressively worsening left flank pain with associated nausea and vomiting since 10/19 with CT scan on admission showing 7 mm left ureteral stone with associated hydronephrosis. No signs of infected stone based on UA, no need for antibiotics. Urology consulted and patient s/p cystoscopy and left ureteroscopy with stone extraction and no stent placement.    2. YULISA: Cr up to 1.25 on 10/23 2/2 to known obstructing stone and resolved with Cr of 0.99 the day of discharge.         Secondary Discharge Diagnoses:     Past Medical History:   Diagnosis Date     ADHD (attention deficit hyperactivity disorder)      Bipolar 1 disorder, manic, mild (H)      Cauda equina syndrome (H)      Chemical injury to cornea of left eye 7-16-15    paint to the left eye     Depressive disorder      GERD (gastroesophageal reflux disease)      Marginal corneal ulcer, left 7/17/2015     Nephrolithiasis      Polysubstance abuse (H)     currently in remission            Code Status:      Full Code        Brief Hospital Summary:        Reason for your hospital stay       You were admitted for concerns of abdominal pain related to a kidney   stone. You  were treated with fluids, flomax and pain/nausea control as   needed. You were seen by our urologists. They opted to take you to surgery   for stone removal without stent placement. Your urine did not look   infected so antibiotics were not prescribed. We will follow your urine   culture and call you if changes need to be made.     We recommend you follow up with your Urologist at [facility] and   discuss possible need for nephrology referral or to the stone clinic per   Urology during this stay.                  Please refer to initial admission history and physical for further details.   Briefly, Ayana Prasad was admitted on 10/22/2018 with acute renal colic. Initial work up in the ED did not reveal evidence of grossly infected stone or significant renal failure/ATN to require inpatient hospitalization. Pt was registered to the Observation Unit for pain control and supportive measures.     Pt was resuscitated with IVF, and anti-emetics. Urine was strained and stone likely not passed.   Urology consult was obtained and patient underwent a procedure for stone removal. Pain control was transitioned from IV to PO form. Labs reviewed and significant results addressed. On the day of discharge, pt's pain was controlled and was able to tolerate PO intake.  Medications were reviewed and adjustments made as necessary. Pt is instructed to follow up as below.           Significant Lab During Hospitalization:        Recent Labs  Lab 10/23/18  0634 10/22/18  1813   WBC 11.8* 12.4*   HGB 13.7 14.5   HCT 41.5 43.0   MCV 86 84    387       Recent Labs  Lab 10/24/18  0621 10/23/18  0634 10/22/18  1813    138 139   POTASSIUM 4.2 3.9 4.5   CHLORIDE 108 107 105   CO2 23 23 22   ANIONGAP 8 8 12   * 94 88   BUN 13 18 16   CR 0.99 1.25* 1.17*   GFRESTIMATED 67 51* 55*   GFRESTBLACK 81 62 67   WILLIAMS 8.7 8.7 9.3       Recent Labs  Lab 10/22/18  2139   COLOR Yellow   APPEARANCE Slightly Cloudy   URINEGLC Negative    URINEBILI Negative   URINEKETONE 80*   SG 1.026   UBLD Moderate*   URINEPH 6.0   PROTEIN 30*   NITRITE Negative   LEUKEST Moderate*   RBCU 71*   WBCU 8*              Significant Imaging During Hospitalization:      Results for orders placed or performed during the hospital encounter of 10/22/18   Abd/pelvis CT no contrast - Stone Protocol    Narrative    CT ABDOMEN AND PELVIS WITHOUT CONTRAST 10/22/2018 8:08 PM    HISTORY: Left flank pain. History of kidney stones.    TECHNIQUE: Helical axial scans from the dome of liver through the  pubic symphysis without contrast. Radiation dose for this scan was  reduced using automated exposure control, adjustment of the mA and/or  kV according to patient size, or iterative reconstruction technique.    COMPARISON: 10/16/2018.    FINDINGS: Again seen is an obstructing calculus in the proximal left  ureter measuring at least 7 mm with a density of 854 Hounsfield units.  This is probably 1 cm more inferior in the ureter than on the prior  exam. Again seen is hydronephrosis and hydroureter proximal to the  obstructing calculus. Numerous small nonobstructing calculi are seen  scattered throughout the right renal collecting system. A few tiny  nonobstructing calculi may be present in the left renal collecting  system but this is uncertain. No evidence for right-sided urinary  tract obstruction.    Moderate diffuse fatty infiltration of the liver and prior  cholecystectomy again noted. The spleen, pancreas, bilateral adrenal  glands and remainder of the kidneys bilaterally are unremarkable. The  bowel and mesentery in the upper abdomen appear normal. There is a  retrocecal appendix with no evidence for appendicitis.    Scans through the pelvis show no acute abnormality or free fluid.  There is a 1.8 cm left ovarian cyst, more conspicuous than on the  prior exam.      Impression    IMPRESSION:  1. Persistent obstructing calculus proximal left ureter which may have  migrated up to 1  cm more inferiorly. Left hydronephrosis and proximal  hydroureter are again seen.  2. Multiple tiny nonobstructing calculi renal collecting systems  bilaterally, significantly more numerous on the right than the left.  3. Fatty infiltration of the liver without change.  4. 1.8 cm left ovarian cyst.    ALEXANDRA PATRICK MD   XR Surgery CADNIE Fluoro L/T 5 Min w Stills    Narrative    This exam was marked as non-reportable because it will not be read by a   radiologist or a [facility] non-radiologist provider.                      Pending Results:        Unresulted Labs Ordered in the Past 30 Days of this Admission     Date and Time Order Name Status Description    10/24/2018 0806 Surgical pathology exam In process     10/23/2018 1728 Stone analysis In process             Consultations This Hospital Stay:      Consultation during this admission received from urology         Discharge Instructions and Follow-Up:      Follow-up Appointments     Follow-up and recommended labs and tests        Follow up with primary care provider, Becki Avery, within 7 days for   hospital follow- up.  No follow up labs or test are needed.  Follow up with your Urologist and discuss need for referral to stone   clinic.                      Discharge Disposition:      Discharged to home         Discharge Medications:        Current Discharge Medication List      START taking these medications    Details   ondansetron (ZOFRAN-ODT) 4 MG ODT tab Take 1 tablet (4 mg) by mouth every 6 hours as needed for nausea or vomiting  Qty: 20 tablet, Refills: 0    Associated Diagnoses: Nephrolithiasis      oxyCODONE IR (ROXICODONE) 5 MG tablet Take 1 tablet (5 mg) by mouth every 6 hours as needed for severe pain  Qty: 6 tablet, Refills: 0    Associated Diagnoses: Flank pain         CONTINUE these medications which have CHANGED    Details   acetaminophen (TYLENOL) 500 MG tablet Take 2 tablets (1,000 mg) by mouth every 4 hours as needed for mild pain     Associated Diagnoses: Flank pain         CONTINUE these medications which have NOT CHANGED    Details   cephALEXin (KEFLEX) 500 MG capsule Take 1 capsule (500 mg) by mouth 2 times daily  Qty: 12 capsule, Refills: 0    Associated Diagnoses: Urinary tract infection with hematuria, site unspecified      tamsulosin (FLOMAX) 0.4 MG capsule Take 1 capsule (0.4 mg) by mouth daily for 10 doses  Qty: 10 capsule, Refills: 0      tretinoin (RETIN-A) 0.05 % cream Apply topically daily         STOP taking these medications       HYDROcodone-acetaminophen (NORCO) 5-325 MG per tablet Comments:   Reason for Stopping:                   Allergies:         Allergies   Allergen Reactions     Adhesive Tape Hives     Prednisone Other (See Comments) and Hives     Suicidal ideation     Droperidol Anxiety           Condition and Physical on Discharge:      Discharge condition: Stable   Vitals: Blood pressure 110/59, pulse 60, temperature 98.6  F (37  C), temperature source Oral, resp. rate 16, SpO2 97 %, not currently breastfeeding.  0 lbs 0 oz      GENERAL:  Comfortable.  PSYCH: pleasant, oriented, No acute distress.  HEENT:  PERRLA. Normal conjunctiva, normal hearing, nasal mucosa and oropharynx are normal.  NECK:  Supple, no neck vein distention, adenopathy or bruits, normal thyroid.  HEART:  Normal S1, S2 with no murmur, no pericardial rub, gallops or S3 or S4.  LUNGS:  Clear to auscultation, normal respiratory effort. No wheezing, rales or ronchi.  ABDOMEN:  Soft, no hepatosplenomegaly, normal bowel sounds. Non-tender, non distended.   EXTREMITIES:  No pedal edema, +2 radial and posterior tibial pulses bilateral and equal.  SKIN:  Dry to touch, No rash, wound or ulcerations.  NEUROLOGIC:  CN 2-12 intact, BL 5/5 symmetric upper and lower extremity strength, sensation is intact with no focal deficits.    Little Duckworth PA-C   gradual onset

## 2018-12-18 NOTE — H&P ADULT - PSYCHIATRIC
1. Have you been to the ER, urgent care clinic since your last visit? Hospitalized since your last visit? No     2. Have you seen or consulted any other health care providers outside of the [address] [place] [address] since your last visit? Include any pap smears or colon screening.  No negative Affect and characteristics of appearance, verbalizations, behaviors are appropriate

## 2020-11-03 ENCOUNTER — EMERGENCY (EMERGENCY)
Facility: HOSPITAL | Age: 40
LOS: 1 days | Discharge: HOME | End: 2020-11-03
Attending: STUDENT IN AN ORGANIZED HEALTH CARE EDUCATION/TRAINING PROGRAM
Payer: MEDICAID

## 2020-11-03 VITALS
DIASTOLIC BLOOD PRESSURE: 99 MMHG | SYSTOLIC BLOOD PRESSURE: 146 MMHG | OXYGEN SATURATION: 97 % | HEART RATE: 79 BPM | RESPIRATION RATE: 19 BRPM | TEMPERATURE: 98 F

## 2020-11-03 VITALS
RESPIRATION RATE: 20 BRPM | WEIGHT: 152.12 LBS | TEMPERATURE: 98 F | OXYGEN SATURATION: 96 % | DIASTOLIC BLOOD PRESSURE: 109 MMHG | HEIGHT: 60 IN | HEART RATE: 91 BPM | SYSTOLIC BLOOD PRESSURE: 153 MMHG

## 2020-11-03 PROBLEM — J93.9 PNEUMOTHORAX, UNSPECIFIED: Chronic | Status: ACTIVE | Noted: 2018-04-22

## 2020-11-03 LAB
ALBUMIN SERPL ELPH-MCNC: 3.9 G/DL — SIGNIFICANT CHANGE UP (ref 3.5–5)
ALP SERPL-CCNC: 107 U/L — SIGNIFICANT CHANGE UP (ref 40–120)
ALT FLD-CCNC: 53 U/L DA — SIGNIFICANT CHANGE UP (ref 10–60)
ANION GAP SERPL CALC-SCNC: 4 MMOL/L — LOW (ref 5–17)
AST SERPL-CCNC: 30 U/L — SIGNIFICANT CHANGE UP (ref 10–40)
BASOPHILS # BLD AUTO: 0.02 K/UL — SIGNIFICANT CHANGE UP (ref 0–0.2)
BASOPHILS NFR BLD AUTO: 0.3 % — SIGNIFICANT CHANGE UP (ref 0–2)
BILIRUB SERPL-MCNC: 0.6 MG/DL — SIGNIFICANT CHANGE UP (ref 0.2–1.2)
BUN SERPL-MCNC: 13 MG/DL — SIGNIFICANT CHANGE UP (ref 7–18)
CALCIUM SERPL-MCNC: 8.9 MG/DL — SIGNIFICANT CHANGE UP (ref 8.4–10.5)
CHLORIDE SERPL-SCNC: 102 MMOL/L — SIGNIFICANT CHANGE UP (ref 96–108)
CO2 SERPL-SCNC: 31 MMOL/L — SIGNIFICANT CHANGE UP (ref 22–31)
CREAT SERPL-MCNC: 0.96 MG/DL — SIGNIFICANT CHANGE UP (ref 0.5–1.3)
EOSINOPHIL # BLD AUTO: 0.36 K/UL — SIGNIFICANT CHANGE UP (ref 0–0.5)
EOSINOPHIL NFR BLD AUTO: 5.9 % — SIGNIFICANT CHANGE UP (ref 0–6)
GLUCOSE SERPL-MCNC: 83 MG/DL — SIGNIFICANT CHANGE UP (ref 70–99)
HCT VFR BLD CALC: 46 % — SIGNIFICANT CHANGE UP (ref 39–50)
HGB BLD-MCNC: 15.4 G/DL — SIGNIFICANT CHANGE UP (ref 13–17)
IMM GRANULOCYTES NFR BLD AUTO: 0.2 % — SIGNIFICANT CHANGE UP (ref 0–1.5)
LYMPHOCYTES # BLD AUTO: 0.88 K/UL — LOW (ref 1–3.3)
LYMPHOCYTES # BLD AUTO: 14.4 % — SIGNIFICANT CHANGE UP (ref 13–44)
MCHC RBC-ENTMCNC: 32.4 PG — SIGNIFICANT CHANGE UP (ref 27–34)
MCHC RBC-ENTMCNC: 33.5 GM/DL — SIGNIFICANT CHANGE UP (ref 32–36)
MCV RBC AUTO: 96.6 FL — SIGNIFICANT CHANGE UP (ref 80–100)
MONOCYTES # BLD AUTO: 0.36 K/UL — SIGNIFICANT CHANGE UP (ref 0–0.9)
MONOCYTES NFR BLD AUTO: 5.9 % — SIGNIFICANT CHANGE UP (ref 2–14)
NEUTROPHILS # BLD AUTO: 4.5 K/UL — SIGNIFICANT CHANGE UP (ref 1.8–7.4)
NEUTROPHILS NFR BLD AUTO: 73.3 % — SIGNIFICANT CHANGE UP (ref 43–77)
NRBC # BLD: 0 /100 WBCS — SIGNIFICANT CHANGE UP (ref 0–0)
NT-PROBNP SERPL-SCNC: 19 PG/ML — SIGNIFICANT CHANGE UP (ref 0–125)
PLATELET # BLD AUTO: 219 K/UL — SIGNIFICANT CHANGE UP (ref 150–400)
POTASSIUM SERPL-MCNC: 4.1 MMOL/L — SIGNIFICANT CHANGE UP (ref 3.5–5.3)
POTASSIUM SERPL-SCNC: 4.1 MMOL/L — SIGNIFICANT CHANGE UP (ref 3.5–5.3)
PROT SERPL-MCNC: 7.7 G/DL — SIGNIFICANT CHANGE UP (ref 6–8.3)
RBC # BLD: 4.76 M/UL — SIGNIFICANT CHANGE UP (ref 4.2–5.8)
RBC # FLD: 13.3 % — SIGNIFICANT CHANGE UP (ref 10.3–14.5)
SODIUM SERPL-SCNC: 137 MMOL/L — SIGNIFICANT CHANGE UP (ref 135–145)
TROPONIN I SERPL-MCNC: <0.015 NG/ML — SIGNIFICANT CHANGE UP (ref 0–0.04)
WBC # BLD: 6.13 K/UL — SIGNIFICANT CHANGE UP (ref 3.8–10.5)
WBC # FLD AUTO: 6.13 K/UL — SIGNIFICANT CHANGE UP (ref 3.8–10.5)

## 2020-11-03 PROCEDURE — 83880 ASSAY OF NATRIURETIC PEPTIDE: CPT

## 2020-11-03 PROCEDURE — 93005 ELECTROCARDIOGRAM TRACING: CPT

## 2020-11-03 PROCEDURE — 99284 EMERGENCY DEPT VISIT MOD MDM: CPT

## 2020-11-03 PROCEDURE — 71046 X-RAY EXAM CHEST 2 VIEWS: CPT | Mod: 26

## 2020-11-03 PROCEDURE — 85025 COMPLETE CBC W/AUTO DIFF WBC: CPT

## 2020-11-03 PROCEDURE — 36415 COLL VENOUS BLD VENIPUNCTURE: CPT

## 2020-11-03 PROCEDURE — 80053 COMPREHEN METABOLIC PANEL: CPT

## 2020-11-03 PROCEDURE — 84484 ASSAY OF TROPONIN QUANT: CPT

## 2020-11-03 PROCEDURE — 99284 EMERGENCY DEPT VISIT MOD MDM: CPT | Mod: 25

## 2020-11-03 PROCEDURE — 71046 X-RAY EXAM CHEST 2 VIEWS: CPT

## 2020-11-03 RX ORDER — FAMOTIDINE 10 MG/ML
20 INJECTION INTRAVENOUS ONCE
Refills: 0 | Status: COMPLETED | OUTPATIENT
Start: 2020-11-03 | End: 2020-11-03

## 2020-11-03 RX ADMIN — Medication 30 MILLILITER(S): at 14:54

## 2020-11-03 RX ADMIN — FAMOTIDINE 20 MILLIGRAM(S): 10 INJECTION INTRAVENOUS at 14:54

## 2020-11-03 NOTE — ED PROVIDER NOTE - ATTENDING CONTRIBUTION TO CARE
male w latent tb  chronic cp for years  reproducible  heart score 0, unlikely acute vasc, pulm, cardiac etiology  labs, imaging neg/unremarkable  tb nodule decr in size, communicated topatient who appeared relieved, admitted to wanting updates on tb status  discharged as no acute process identified that would require emergent inpatient intervention.

## 2020-11-03 NOTE — CHART NOTE - NSCHARTNOTEFT_GEN_A_CORE
Patient is a 40 year old male; presented to [facility] with complaint of left chest pain, radiates to left upper back, worse x yesterday.    Patient screened positive for SBIRT; SBIRT addressed/completed in [place].  Patient with no need for SW intervention; education and positive reinforcement provided reg: alcohol/susbtance use.

## 2020-11-03 NOTE — ED ADULT NURSE NOTE - ED STAT RN HANDOFF DETAILS
Patient discharged home as per MD order, IV access removed no redness, swelling or bleeding noted at site. All discharge instructions and F/U visits provided to patient, patient verbalizes understanding leaving ambulatory in no acute distress.

## 2020-11-03 NOTE — ED PROVIDER NOTE - NS ED ROS FT
General: overall feels well  HEENT: no HA  Resp: mild SOB occ, no cough  CV: burning left-sided CP radiating to back   GI: no abdominal pain, no n/v/d/c  Ext: no swelling  Psych: denies anxiety and depression   all other systems reviewed and are negative

## 2020-11-03 NOTE — ED PROVIDER NOTE - PHYSICAL EXAMINATION
General: well-appearing, NAD, appears stated age  HEENT: EOMI, PERRL  Resp: lungs CTA b/l, no increased WOB  CV: RRR, no MRG  GI: soft, NTND  Neuro: AOx3  Psych: calm, appropriate mood and affect  Ext: no edema  Skin: warm, dry, intact General: well-appearing, NAD, appears stated age  HEENT: EOMI, PERRL, lymph node palpated in right posterolateral neck  Resp: lungs CTA b/l, no increased WOB  CV: RRR, no MRG  GI: soft, NTND  Neuro: AOx3  Psych: calm, appropriate mood and affect  Ext: no edema  Skin: warm, dry, intact

## 2020-11-03 NOTE — ED PROVIDER NOTE - OBJECTIVE STATEMENT
40M PMH pneumothorax, latent TB (2018, discharged with isoniazid and pyridoxine) presenting with x2-3 years left-sided burning CP radiating to back, mildly worse in the last few days, worse with lying down and improved with movement/palpation, largely unchanged since last presentation. Endorses mild occasional SOB. Denies HA, fatigue, leg swelling, n/v/d/c, although reports that he occasionally tries to make himself throw up as this relieves the pain temporarily. 40M PMH pneumothorax, latent TB (2018, discharged with isoniazid and pyridoxine) presenting with x2-3 years left-sided burning CP radiating to back, mildly worse in the last few days, worse with lying down and improved with movement/palpation, largely unchanged since last presentation. Endorses mild occasional SOB. Denies HA, fatigue, leg swelling, n/v/d/c, although reports that he occasionally tries to make himself throw up as this relieves the pain temporarily. Patient also reports painless small lump on the right side of his neck, unrelated to remainder of symptoms.

## 2020-11-03 NOTE — ED PROVIDER NOTE - CLINICAL SUMMARY MEDICAL DECISION MAKING FREE TEXT BOX
Patient seen and examined at bedside. History and findings as above. Will obtain CBC, BMP, EKG, trop, BNP to rule out acute ACS and CHF.

## 2020-11-03 NOTE — ED PROVIDER NOTE - PROGRESS NOTE DETAILS
Imaging and labs reviewed. CXR showed improvement from previous lung nodule, otherwise unremarkable. Trop negative x1, BNP wnl, labs otherwise unremarkable. Symptoms likely 2/2 GERD, given x1 dose Pepcid and Maalox. Will recommend pulm follow up for review of latent TB treatment/progress and GI follow up for evaluation of GERD. Will recommend follow up with PCP for further evaluation of right-sided node in neck. Imaging and labs reviewed. CXR showed improvement from previous lung nodule, otherwise unremarkable. Trop negative x1, BNP wnl, labs otherwise unremarkable. Symptoms likely 2/2 GERD, given x1 dose Pepcid and Maalox. Will recommend pulm follow up for review of latent TB treatment/progress and GI follow up for evaluation of GERD. Will recommend follow up with PCP for further evaluation of right-sided node in neck. Discussed plans with patient with assistance of [place]  Randal [phone].

## 2020-11-03 NOTE — ED PROVIDER NOTE - PATIENT PORTAL LINK FT
You can access the FollowMyHealth Patient Portal offered by [facility] by registering at the following website: [email or web address]. By joining [facility]’s FollowMyHealth portal, you will also be able to view your health information using other applications (apps) compatible with our system.

## 2020-11-03 NOTE — ED ADULT NURSE NOTE - OBJECTIVE STATEMENT
pt from home c/o of Lt chest pain radiation to Lt upper back, pt reports "I have the same chest pain for 1year and 5 months and its getting worse now"

## 2020-11-28 NOTE — PATIENT PROFILE ADULT. - FUNCTIONAL SCREEN CURRENT LEVEL: TOILETING, MLM
History of Present Illness





- General


Chief Complaint: Respiratory Problem


Stated Complaint: cough,decreased oxygen sats,weakness


Time Seen by Provider: 11/28/20 11:35


Source: patient


Additional Information: 





PMHX OF DM TYPE 2 ON MEDS AND HTN ON MEDS, ALSO HAS HIS OR IDIOPATHIC CIRRHOSIS,

PORTAL HTN AND RECENTLY DX W/ PRIMARY HEPATIC TUMOR FOR WHICH SHE WILL BE 

GETTING THERAPY FOR SOON. 





MULTIPLE FAMILY MEMBERS INCLUDING  COVID POSITIVE. 





- History of Present Illness


Comments: 





PATIENT W/ POSITIVE COVID TEST 3 DAYS AGO, SYMPTOMS OF COUGH X 3 DAYS, SHE FEELS

SOME SOB AND SAYS HER O2 SAT HAS BEEN LOWER THAN HER USUAL, 93-94 AT HOME. 

STATES HER USUAL IS 97, 


Cough Quality/Degree: moderate


Possible Cause: no prior episodes


Improving Factors: nothing


Worsening Factors: nothing


Associated Symptoms: cough, fever/chills, nasal congestion, shortness of breath,

other - LOSS OF TASTE


Allergies/Adverse Reactions: 


Allergies





Tizanidine [From Zanaflex] Allergy (Verified 11/28/20 11:15)


   


Amoxicillin [From Augmentin] Adverse Reaction (Verified 10/17/19 17:52)


   


Clavulanic Acid [From Augmentin] Adverse Reaction (Verified 10/17/19 17:52)


   


Prochlorperazine [From Compazine] Adverse Reaction (Verified 10/17/19 17:52)


   





Home Medications: 


Ambulatory Orders





Furosemide 20 mg PO DAILY 10/17/19 


Spironolactone 25 mg PO DAILY 10/17/19 


Cholecalciferol [Vitamin D3] 50 mcg PO DAILY 11/28/20 


Esomeprazole Magnesium [Nexium] 40 mg PO DAILY 11/28/20 


Ferrous Sulfate [Iron (Ferrous Sulfate)] 50 mg PO DAILY 11/28/20 


Magnesium 500 mg PO DAILY 11/28/20 


Metformin HCl [Metformin Hydrochloride E] 250 mg PO DAILY 11/28/20 


Tramadol HCl 50 mg PO PRN 11/28/20 


Vitamin E 360 mg PO DAILY 11/28/20 











Review of Systems





- Review of Systems


Constitutional: States: no symptoms reported


EENTM: States: no symptoms reported


Respiratory: States: see HPI


Cardiology: States: no symptoms reported


Gastrointestinal/Abdominal: States: no symptoms reported


Genitourinary: States: no symptoms reported


Musculoskeletal: States: no symptoms reported


Skin: States: no symptoms reported


Neurological: States: no symptoms reported


Endocrine: States: no symptoms reported


Hematologic/Lymphatic: States: no symptoms reported


All other Systems: Reviewed and Negative





Past Medical History (General)





- Patient Medical History


Hx Seizures: No


Hx Stroke: No


Hx Asthma: No


Hx of COPD: No


Hx Congestive Heart Failure: No


Hx Hypertension: Yes


Hx Diabetes: No


Hx Gastroesophageal Reflux: Yes


Hx Cancer: No


Hx Hepatitis C: No


Hx MRSA: No


Surgical History: Hysterectomy





- Vaccination History


Hx Tetanus, Diphtheria Vaccination: Yes


Hx Influenza Vaccination: Yes


Hx Pneumococcal Vaccination: Yes





- Social History


Hx Tobacco Use: Yes


Hx Alcohol Use: No


Hx Substance Use: No


Hx Substance Use Treatment: No


Hx Depression: No





Family Medical History





- Family History


  ** Mother


Family History: Unknown





Physical Exam





- Physical Exam


General Appearance: Alert, Comfortable, Well Developed, Well Hydrated


ENT Exam: normal ENT inspection


Neck: non-tender, full range of motion, supple


Respiratory: chest non-tender, lungs clear, normal breath sounds


Cardiovascular/Chest: normal peripheral pulses, regular rate, rhythm, no edema


Gastrointestinal/Abdominal: normal bowel sounds, non tender, soft


Extremity: normal range of motion, non-tender, normal inspection


Neurologic: CNs II-XII nml as tested, no motor/sensory deficits, alert, oriented

x 3


Skin Exam: normal color, warm/dry





Progress





- Progress


Progress: 





11/28/20 13:28


2 VIEW CXR NOT PERFORMED DUE TO PT HAVING KNOWN COVID AND ADDITIONAL EXPOSURE 

RISK NOT WARRANTED. 


11/28/20 13:29


PATIENT WITH MILD COVID DZ EXCEPT IMPRESSIVE PANCYTOPENIA LIKELY RELATED TO 

COVID. PATIENT HAS NO HISTORY. SEVERE THROMBOCYTOPENIA AND LEUKOPENIA. PATIENT 

CERTAINLY WITH MARKERS AND HISTORY TO SUGGEST AT RISK FOR SEVERE DISEASE, 

HOWEVER AT THIS POINT DOES NOT HAVE A REASONABLE INDICATION TO RECOMMEND 

HOSPITALIZATION.  INDEED, PATIENT'S RISK MIGHT EVEN BE INCREASED BY UNNESSESARY 

HOSPITALIZATION AT THIS TIME. 


11/28/20 13:42








Departure





- Departure


Clinical Impression: 


 COVID-19, Thrombocytopenia





HTN (hypertension)


Qualifiers:


 Hypertension type: essential hypertension Qualified Code(s): I10 - Essential 

(primary) hypertension





Diabetes mellitus


Qualifiers:


 Diabetes mellitus type: type 2 Diabetes mellitus long term insulin use: unspec

ified long term insulin use status Diabetes mellitus complication status: 

without complication Qualified Code(s): E11.9 - Type 2 diabetes mellitus without

complications





Disposition: Discharge to Home or Self Care


Departure Forms:  ED Discharge - Pt. Copy, Patient Portal Self Enrollment


Instructions:  Coronavirus Disease 2019 (COVID-19), Bleeding Precautions


Activity: no exercise, no lifting, other - MINIMIZE CHORES, OTHER THAN WALKING, 

AVOID USE OF TOOLS,KNIVES ETC OR ANYTHING THAT INCREASES RISK OF INJURY. 


Referrals: 


Harshal Jackson III, MD [Primary Care Provider] - 1-2 Weeks


Home Medications: 


Ambulatory Orders





Furosemide 20 mg PO DAILY 10/17/19 


Spironolactone 25 mg PO DAILY 10/17/19 


Cholecalciferol [Vitamin D3] 50 mcg PO DAILY 11/28/20 


Esomeprazole Magnesium [Nexium] 40 mg PO DAILY 11/28/20 


Ferrous Sulfate [Iron (Ferrous Sulfate)] 50 mg PO DAILY 11/28/20 


Magnesium 500 mg PO DAILY 11/28/20 


Metformin HCl [Metformin Hydrochloride E] 250 mg PO DAILY 11/28/20 


Tramadol HCl 50 mg PO PRN 11/28/20 


Vitamin E 360 mg PO DAILY 11/28/20 








Additional Instructions: 


RECOMMEND REPEAT CBC THIS WEEK. CONTACT YOUR PCP FOR AN ORDER. RETURN TO ER FOR 

O2 SATURATION BELOW 90%. (0) independent

## 2021-01-11 NOTE — PATIENT PROFILE ADULT. - NSCAFFEAMTFREQ_GEN_ALL_CORE_SD
1-2 cups/cans per day Suturegard Body: The suture ends were repeatedly re-tightened and re-clamped to achieve the desired tissue expansion.

## 2021-04-09 NOTE — DIETITIAN INITIAL EVALUATION ADULT. - NO ACTIVE NUTRITION DIAGNOSIS IDENTIFIED AT THIS TIME
Transition Communication Hand-off for Care Transitions to Next Level of Care Provider    Name: Sun Mireles  : 1952  MRN #: [id number]  Primary Care Provider: Kelli Vanessa     Primary Clinic: [address] [facility] [address]     Reason for Hospitalization:  ESRD (end stage renal disease) on dialysis (H) [N18.6, Z99.2]  Complicated UTI (urinary tract infection) [N39.0]  Type 2 diabetes mellitus with diabetic nephropathy, with long-term current use of insulin (H) [E11.21, Z79.4]  Admit Date/Time: 2021  9:10 PM  Discharge Date: 21  Payor Source: Payor: MEDICARE / Plan: MEDICARE / Product Type: Medicare /     Discharge Plan: Pt to discharge today back to LTC.     Concern for non-adherence with plan of care:   Y/N NA  Discharge Needs Assessment:  Needs      Most Recent Value   Equipment Currently Used at Home  wheelchair, manual          Already enrolled in Tele-monitoring program and name of program:  NA  Follow-up specialty is recommended: Yes    Follow-up plan:  No future appointments.    Any outstanding tests or procedures:        Referrals     Future Labs/Procedures    Physical Therapy Adult Consult     Comments:    Evaluate and treat as clinically indicated.    Reason:  Debility and frailty            Key Recommendations:      ELROY Wang    AVS/Discharge Summary is the source of truth; this is a helpful guide for improved communication of patient story           Statement Selected

## 2021-04-12 ENCOUNTER — EMERGENCY (EMERGENCY)
Facility: HOSPITAL | Age: 41
LOS: 1 days | Discharge: HOME | End: 2021-04-12
Attending: EMERGENCY MEDICINE
Payer: MEDICAID

## 2021-04-12 VITALS
DIASTOLIC BLOOD PRESSURE: 107 MMHG | OXYGEN SATURATION: 100 % | HEART RATE: 102 BPM | TEMPERATURE: 99 F | HEIGHT: 60 IN | WEIGHT: 157.41 LBS | SYSTOLIC BLOOD PRESSURE: 149 MMHG | RESPIRATION RATE: 18 BRPM

## 2021-04-12 VITALS
HEART RATE: 76 BPM | TEMPERATURE: 98 F | OXYGEN SATURATION: 98 % | SYSTOLIC BLOOD PRESSURE: 142 MMHG | RESPIRATION RATE: 18 BRPM | DIASTOLIC BLOOD PRESSURE: 91 MMHG

## 2021-04-12 LAB
ANION GAP SERPL CALC-SCNC: 4 MMOL/L — LOW (ref 5–17)
BASOPHILS # BLD AUTO: 0.01 K/UL — SIGNIFICANT CHANGE UP (ref 0–0.2)
BASOPHILS NFR BLD AUTO: 0.2 % — SIGNIFICANT CHANGE UP (ref 0–2)
BUN SERPL-MCNC: 18 MG/DL — SIGNIFICANT CHANGE UP (ref 7–18)
CALCIUM SERPL-MCNC: 8.6 MG/DL — SIGNIFICANT CHANGE UP (ref 8.4–10.5)
CHLORIDE SERPL-SCNC: 110 MMOL/L — HIGH (ref 96–108)
CO2 SERPL-SCNC: 25 MMOL/L — SIGNIFICANT CHANGE UP (ref 22–31)
CREAT SERPL-MCNC: 0.8 MG/DL — SIGNIFICANT CHANGE UP (ref 0.5–1.3)
EOSINOPHIL # BLD AUTO: 0.51 K/UL — HIGH (ref 0–0.5)
EOSINOPHIL NFR BLD AUTO: 8.6 % — HIGH (ref 0–6)
GLUCOSE SERPL-MCNC: 91 MG/DL — SIGNIFICANT CHANGE UP (ref 70–99)
HCT VFR BLD CALC: 39.2 % — SIGNIFICANT CHANGE UP (ref 39–50)
HGB BLD-MCNC: 13.4 G/DL — SIGNIFICANT CHANGE UP (ref 13–17)
IMM GRANULOCYTES NFR BLD AUTO: 0.2 % — SIGNIFICANT CHANGE UP (ref 0–1.5)
LIDOCAIN IGE QN: 163 U/L — SIGNIFICANT CHANGE UP (ref 73–393)
LYMPHOCYTES # BLD AUTO: 1.77 K/UL — SIGNIFICANT CHANGE UP (ref 1–3.3)
LYMPHOCYTES # BLD AUTO: 29.8 % — SIGNIFICANT CHANGE UP (ref 13–44)
MCHC RBC-ENTMCNC: 32.5 PG — SIGNIFICANT CHANGE UP (ref 27–34)
MCHC RBC-ENTMCNC: 34.2 GM/DL — SIGNIFICANT CHANGE UP (ref 32–36)
MCV RBC AUTO: 95.1 FL — SIGNIFICANT CHANGE UP (ref 80–100)
MONOCYTES # BLD AUTO: 0.66 K/UL — SIGNIFICANT CHANGE UP (ref 0–0.9)
MONOCYTES NFR BLD AUTO: 11.1 % — SIGNIFICANT CHANGE UP (ref 2–14)
NEUTROPHILS # BLD AUTO: 2.97 K/UL — SIGNIFICANT CHANGE UP (ref 1.8–7.4)
NEUTROPHILS NFR BLD AUTO: 50.1 % — SIGNIFICANT CHANGE UP (ref 43–77)
NRBC # BLD: 0 /100 WBCS — SIGNIFICANT CHANGE UP (ref 0–0)
PLATELET # BLD AUTO: 192 K/UL — SIGNIFICANT CHANGE UP (ref 150–400)
POTASSIUM SERPL-MCNC: 3.8 MMOL/L — SIGNIFICANT CHANGE UP (ref 3.5–5.3)
POTASSIUM SERPL-SCNC: 3.8 MMOL/L — SIGNIFICANT CHANGE UP (ref 3.5–5.3)
RBC # BLD: 4.12 M/UL — LOW (ref 4.2–5.8)
RBC # FLD: 12.2 % — SIGNIFICANT CHANGE UP (ref 10.3–14.5)
SODIUM SERPL-SCNC: 139 MMOL/L — SIGNIFICANT CHANGE UP (ref 135–145)
TROPONIN I SERPL-MCNC: <0.015 NG/ML — SIGNIFICANT CHANGE UP (ref 0–0.04)
WBC # BLD: 5.93 K/UL — SIGNIFICANT CHANGE UP (ref 3.8–10.5)
WBC # FLD AUTO: 5.93 K/UL — SIGNIFICANT CHANGE UP (ref 3.8–10.5)

## 2021-04-12 PROCEDURE — 93005 ELECTROCARDIOGRAM TRACING: CPT

## 2021-04-12 PROCEDURE — 80048 BASIC METABOLIC PNL TOTAL CA: CPT

## 2021-04-12 PROCEDURE — 84484 ASSAY OF TROPONIN QUANT: CPT

## 2021-04-12 PROCEDURE — 99285 EMERGENCY DEPT VISIT HI MDM: CPT

## 2021-04-12 PROCEDURE — 99284 EMERGENCY DEPT VISIT MOD MDM: CPT | Mod: 25

## 2021-04-12 PROCEDURE — 71046 X-RAY EXAM CHEST 2 VIEWS: CPT | Mod: 26

## 2021-04-12 PROCEDURE — 96375 TX/PRO/DX INJ NEW DRUG ADDON: CPT

## 2021-04-12 PROCEDURE — 83690 ASSAY OF LIPASE: CPT

## 2021-04-12 PROCEDURE — 71046 X-RAY EXAM CHEST 2 VIEWS: CPT

## 2021-04-12 PROCEDURE — 96374 THER/PROPH/DIAG INJ IV PUSH: CPT

## 2021-04-12 PROCEDURE — 85025 COMPLETE CBC W/AUTO DIFF WBC: CPT

## 2021-04-12 PROCEDURE — 36415 COLL VENOUS BLD VENIPUNCTURE: CPT

## 2021-04-12 RX ORDER — ACETAMINOPHEN 500 MG
650 TABLET ORAL ONCE
Refills: 0 | Status: COMPLETED | OUTPATIENT
Start: 2021-04-12 | End: 2021-04-12

## 2021-04-12 RX ORDER — FAMOTIDINE 10 MG/ML
20 INJECTION INTRAVENOUS ONCE
Refills: 0 | Status: COMPLETED | OUTPATIENT
Start: 2021-04-12 | End: 2021-04-12

## 2021-04-12 RX ORDER — AMLODIPINE BESYLATE 2.5 MG/1
1 TABLET ORAL
Qty: 30 | Refills: 0
Start: 2021-04-12 | End: 2021-05-11

## 2021-04-12 RX ORDER — ONDANSETRON 8 MG/1
4 TABLET, FILM COATED ORAL ONCE
Refills: 0 | Status: COMPLETED | OUTPATIENT
Start: 2021-04-12 | End: 2021-04-12

## 2021-04-12 RX ADMIN — FAMOTIDINE 20 MILLIGRAM(S): 10 INJECTION INTRAVENOUS at 20:44

## 2021-04-12 RX ADMIN — Medication 650 MILLIGRAM(S): at 20:44

## 2021-04-12 RX ADMIN — Medication 650 MILLIGRAM(S): at 21:58

## 2021-04-12 RX ADMIN — ONDANSETRON 4 MILLIGRAM(S): 8 TABLET, FILM COATED ORAL at 20:44

## 2021-04-12 RX ADMIN — Medication 30 MILLILITER(S): at 20:44

## 2021-04-12 NOTE — ED PROVIDER NOTE - SCRIBE NAME
Pallavaraja Kannaiyan is a very pleasant 51 year old male, here for:    ROUTINE PHYSICAL EXAMINATION  NEW PATIENT    Acute   concerns:   >>>>  Small pain in the right heel during bending, at times.  Infrequent    >>>>  Heartburn  Occurs before and after meals  Takes tums sometimes with good response.      >>>> elevated Blood pressure   Never treated  He thinks it is related to doctor's visits only.  Exercise 5 days per week, walk 3 miles  He sometimes feels left jaw pain during elevated Blood pressure   Denies chest pain or dyspnea on exertion   Has had prior documented elevated Blood pressure   He has never checked Blood pressure at home, so does not know if Blood pressure is normal when he is not in a stressful situation.    BP Readings from Last 4 Encounters:   10/10/19 160/90   18 (!) 152/98   18 120/70            Wt Readings from Last 2 Encounters:   10/10/19 76.7 kg (169 lb)   18 72.6 kg (160 lb)          Immunization History   Administered Date(s) Administered   • Tdap 2018        PMH:  -\"white coat\" hypertension     PSH:  -none    SOCIAL HX:  -no cig  -2 drinks per week  -work: software    FAMILY HX:  -father: diabetes (alive)  -mother:  hypertension ()    Current Outpatient Medications   Medication Sig   • amLODIPine (NORVASC) 5 MG tablet Take 1 tablet by mouth daily.          ROS:  --HENT: no eye problems, no nose problems, no sinus problems, no ear problems  --General: No fever, no chills, no severe fatigue, no weight changes  --Musculoskeletal: no muscle pain, no back pain, no joint pain  --Respiratory: No cough, no resting dyspnea, no exertional dyspnea   --Cardiovascular: No substernal chest discomfort, no chest pressure, no palpitations.  --GI: No abdominal pain, [+]  heartburn, no bloody stool, no black stool, no nausea, no vomiting, no diarrhea.  --Genitourinary: No dysuria, no urinary frequency, no hematuria, no nocturia  --Neurologic: No syncope, no dizziness, no  headache.  --Endocrine: No polydipsia, no polyuria,   --Hematologic: no bruising, no uncontrolled bleeding  --Psychiatric: no depression, no anxiety, no sleep problems  --Skin: no rash, no lesions           PHYSICAL EXAM:    /90   Pulse 66   Temp 98.3 °F (36.8 °C) (Temporal)   Resp 16   Ht 5' 10\" (1.778 m)   Wt 76.7 kg (169 lb)   BMI 24.25 kg/m²   BSA 1.94 m²          GENERAL:  He is a well developed, well-nourished male, no acute distress,  ambulatory, alert/oriented x 3    HEAD: normocephalic, atraumatic, no scalp lesions, no rashes seen.    EYES:  PERRL, EOMI, Right eye: non-injected conjunctiva, no icterus.  Left eye: non-injected conjunctiva, no icterus.    THROAT:  oropharynx clear,  oral mucosa pink and moist, no tonsillar hypertrophy, no tonsillar exudate.  MOUTH:  Normal teeth, gums, tongue. Oral mucosa without lesions.    NECK:  Supple, normal flexion/extension/rotation, no lymphadenopathy, no thyromegaly, no jvd, no carotid bruits    CHEST:  auscultation:  clear on the left, on the right, normal respiratory effort    CVS: Regular rhythm, normal heart rate, normal S1, S2; no S3, no S4, no murmur,     ABD: normo-active bowel sounds, soft to palpation, no distension, no tenderness, no hepato-splenomegaly, no hernias,    EXT: no clubbing, cyanosis, edema.  No varicosity, no calf tenderness, no calf induration.  DP/PT pulses are 2+ bilaterally,      NEURO:  CN II-XII intact, no nystagmus; Normal gait. Normal mentation. No focal neurological abnormalities.                 -------------------------  ASSESSMENT / PLAN:  1. Routine physical examination  -physical examination of the patient was essentially within normal limits.  -patient was counseled on appropriate diet, regular daily exercise,   -BMI was reviewed, and maintenance of appropriate body weight discussed.  -reviewed health risks such as smoking,  alcohol intake,   -reviewed genetic risk for disease by checking family history.  -discussed  that a routine physical entails mainly preventive care. Any new or chronic medical problems evaluated today may be billed as a separate office visit.     - LIPID PANEL WITH REFLEX; Future  - CBC & AUTO DIFFERENTIAL; Future  - COMPREHENSIVE MET PNL (FAST); Future  - PSA, TOTAL (SCREENING); Future  - THYROID STIMULATING HORMONE REFLEX; Future  - SERVICE TO GASTROENTEROLOGY for Screening colonoscopy      - ELECTROCARDIOGRAM 12-LEAD: normal     2. Special screening for malignant neoplasm of prostate  - PSA, TOTAL (SCREENING); Future    3. Essential hypertension  Blood pressure manually confirmed by me  Blood pressure is elevated  Advised to get home Blood pressure cuff  DASH diet discussed and printed out  Start amlodipine  Come in for Blood pressure check with a nurse in 4 weeks    - amLODIPine (NORVASC) 5 MG tablet; Take 1 tablet by mouth daily.  Dispense: 30 tablet; Refill: 1    4. Gastro-esophageal reflux disease without esophagitis  Refer to GI for Screening colonoscopy and upper endoscopy   Try pepcid or tagamet as needed  [place] diet information printed out and discussed in detail  - SERVICE TO GASTROENTEROLOGY    In addition to routine physical, multiple other issues were addressed  -patient was counseled regarding the above issue/s for over 50% of the 40 minute encounter.       Return in about 3 months (around 1/10/2020) for FOLLOW UP for ONGOING MEDICAL CONDITIONS.     Please call us if you have any urgent concerns before the next visit:  [phone]      All of the instruction documented above was provided to the patient in writing via an After-Visit Summary.      The patient indicates understanding of these issues and agrees with the plan.    _______________  Electronically Signed By:  Roman Dreyer, MD. 10:05 AM, 10/10/2019         Rocco Byers ([facility]

## 2021-04-12 NOTE — ED PROVIDER NOTE - IV ALTEPLASE DOOR HIDDEN
The prior authorization for Yolanda Betts's Metoprolol Succinate 100mg has been submitted on 9/14/2020 @ 6:02pm.  It can take up to 72 hours for a decision to be rendered from the insurance.  Patient is aware of PA and process.  Please let me know if you have any questions.    Thank You!   Consuelo Wolfe CPhT, B.A  Patient Care Advocate   [facility]  Phone: [phone] Ext [phone]  Fax: [phone]     show

## 2021-04-12 NOTE — ED ADULT NURSE NOTE - NSFALLRSKUNASSIST_ED_ALL_ED
[facility]  [facility], [address]  [address]  [facility] [id number] [phone]    5/2/2018    Abiola Murphy  [address] [facility] [address]      Dear Abiola,     I hope you are doing well.    It has been sometime since I have heard from you and I want to reach out to see if you need additional services. Please contact me at [phone] if you would like to schedule another appointment.    If I do not hear from you by 6/2/2018, I will assume you no longer desire my services and this episode of care will be closed. However, you may call me at any time and we will be happy to see you again.    You may also choose another provider in the community. We have included a list of other possible resources.    [facility] [phone], [facility] [phone] and [facility] [phone]    You also have the right to have this discharge reviewed prior to 6/2/2018by [facility] Certification Section, Division of , [address], [place] [address], Phone [phone].     If you have questions or concerns with this letter, please contact us directly to discuss at [phone]    Again, we would welcome the chance to work with you again at any point in the future.      Take Care,        EUSEBIA MendietaW   no

## 2021-04-12 NOTE — ED PROVIDER NOTE - PATIENT PORTAL LINK FT
You can access the FollowMyHealth Patient Portal offered by [facility] by registering at the following website: [email or web address]. By joining [employer]’s FollowMyHealth portal, you will also be able to view your health information using other applications (apps) compatible with our system.

## 2021-04-13 PROBLEM — Z22.7 LATENT TUBERCULOSIS: Chronic | Status: ACTIVE | Noted: 2020-11-03

## 2021-07-14 NOTE — PROGRESS NOTE ADULT - ATTENDING COMMENTS
patient seen and examined. case fully discussed with medical resident. reviewed chief complaint. reviewed review of systems. reviewed list of medication,  reviewed physical exam. reviewed assessment and plan. agree with above. doubt TB. need to rule out lymphome, may need bronchoscopy when  pneumonia resolved. dvt projphylaxis
patient seen and examined. case fully discussed with medical resident. reviewed chief complaint. reviewed review of systems. reviewed list of medication,  reviewed physical exam. reviewed assessment and plan. agree with above. patient is scheduled for bronchoscopy in am.
patient seen and examined. case fully discussed with medical resident. reviewed chief complaint. reviewed review of systems. reviewed list of medication,  reviewed physical exam. reviewed assessment and plan. agree with above. patient with bronchoscopy today, will follow results with dr toussaint tomorrow. follow up biopsy report.
patient seen and examined. case fully discussed with medical resident. reviewed chief complaint. reviewed review of systems. reviewed list of medication,  reviewed physical exam. reviewed assessment and plan. agree with above. patient with bronchoscopy yesterday,  will follow results with dr toussaint as out patient.  follow up biopsy report as out patient with dr toussaint. will d/c tomorrow and f/u with pmd and dr toussaint..
patient seen and examined. case fully discussed with medical resident. reviewed chief complaint. reviewed review of systems. reviewed list of medication,  reviewed physical exam. reviewed assessment and plan. agree with above. patient feels well. AFB - x 3. lung lesions possibly lymphoma. will follow up with dr toussaint for possible colonoscopy.
No

## 2021-10-29 NOTE — DISCHARGE NOTE ADULT - HOSPITAL COURSE
37 yoM with PMH pneumothorax, B/L cavitary lung lesion admitted to [facility] in june, 2017- 2xAFB negative, bronchoscopy and biopsy negative at that time, latent TB on isoniazide presented to ED on 4/24 with complaints of chest pain discharged from ED came back as chest pain did not improved with pain medications. Chest pain is sternal radiating to left arm and left back , 3/10 in intensity, burning in nature, not associated with movement, exercise, cough. reported occasional cough during winter months which improved with out medications. Reports eating spicy food but this pain is different then pain he usually have after spicy food.     Pt admitted to tele for ACS rule out.  Cardiac enzymes were neg.  Echocardiogram was significant for Lt ventricular dysfunction, and Stress test negative for wall ischemia.  Pt instructed to schedule a close follow up with your PCP within 1 week of discharge. 37 yoM with PMH pneumothorax, B/L cavitary lung lesion admitted to [facility] in june, 2017- 2xAFB negative, bronchoscopy and biopsy negative at that time, latent TB on isoniazide presented to ED on 4/24 with complaints of chest pain discharged from ED came back as chest pain did not improved with pain medications. Chest pain is sternal radiating to left arm and left back , 3/10 in intensity, burning in nature, not associated with movement, exercise, cough. reported occasional cough during winter months which improved with out medications. Reports eating spicy food but this pain is different then pain he usually have after spicy food.     Pt admitted to tele for ACS rule out.  Cardiac enzymes were neg.  Echocardiogram was significant for normal Lt ventricular function, and Stress test negative for wall ischemia.      Pt currently medically stable for discharge.  Follow up appt made with [facility] on 4/30 @ 1pm for follow up. 37 yoM with PMH pneumothorax, B/L cavitary lung lesion admitted to [facility] in june, 2017- 2xAFB negative, bronchoscopy and biopsy negative at that time, latent TB on isoniazide presented to ED on 4/24 with complaints of chest pain discharged from ED came back as chest pain did not improved with pain medications. Chest pain is sternal radiating to left arm and left back , 3/10 in intensity, burning in nature, not associated with movement, exercise, cough. reported occasional cough during winter months which improved with out medications. Reports eating spicy food but this pain is different then pain he usually have after spicy food.     Pt admitted to tele for ACS rule out.  Cardiac enzymes were neg.  Echocardiogram was significant for normal Lt ventricular function, and Stress test negative for wall ischemia.    Pt remains asymptomatic.     Pt currently medically stable for discharge.  Follow up appt made with [facility] on 4/30 @ 1pm for follow up. 29-Oct-2021 08:00

## 2021-10-29 NOTE — ED POST DISCHARGE NOTE - NS ED POST DC CALL 1
Attempted, left message The Delivery OB Provider certifies that vaginal examination and/or abdominal examination after the delivery was done and no foreign body was found.

## 2022-01-05 ENCOUNTER — EMERGENCY (EMERGENCY)
Facility: HOSPITAL | Age: 42
LOS: 1 days | Discharge: HOME | End: 2022-01-05
Attending: EMERGENCY MEDICINE
Payer: MEDICAID

## 2022-01-05 VITALS
TEMPERATURE: 99 F | SYSTOLIC BLOOD PRESSURE: 145 MMHG | HEIGHT: 60 IN | RESPIRATION RATE: 18 BRPM | WEIGHT: 161.38 LBS | OXYGEN SATURATION: 97 % | DIASTOLIC BLOOD PRESSURE: 111 MMHG | HEART RATE: 98 BPM

## 2022-01-05 VITALS
RESPIRATION RATE: 16 BRPM | DIASTOLIC BLOOD PRESSURE: 89 MMHG | SYSTOLIC BLOOD PRESSURE: 138 MMHG | HEART RATE: 82 BPM | OXYGEN SATURATION: 98 %

## 2022-01-05 LAB
ALBUMIN SERPL ELPH-MCNC: 4 G/DL — SIGNIFICANT CHANGE UP (ref 3.5–5)
ALP SERPL-CCNC: 101 U/L — SIGNIFICANT CHANGE UP (ref 40–120)
ALT FLD-CCNC: 71 U/L DA — HIGH (ref 10–60)
ANION GAP SERPL CALC-SCNC: 5 MMOL/L — SIGNIFICANT CHANGE UP (ref 5–17)
AST SERPL-CCNC: 29 U/L — SIGNIFICANT CHANGE UP (ref 10–40)
BASOPHILS # BLD AUTO: 0.02 K/UL — SIGNIFICANT CHANGE UP (ref 0–0.2)
BASOPHILS NFR BLD AUTO: 0.2 % — SIGNIFICANT CHANGE UP (ref 0–2)
BILIRUB SERPL-MCNC: 0.5 MG/DL — SIGNIFICANT CHANGE UP (ref 0.2–1.2)
BUN SERPL-MCNC: 12 MG/DL — SIGNIFICANT CHANGE UP (ref 7–18)
CALCIUM SERPL-MCNC: 9.1 MG/DL — SIGNIFICANT CHANGE UP (ref 8.4–10.5)
CHLORIDE SERPL-SCNC: 107 MMOL/L — SIGNIFICANT CHANGE UP (ref 96–108)
CO2 SERPL-SCNC: 26 MMOL/L — SIGNIFICANT CHANGE UP (ref 22–31)
CREAT SERPL-MCNC: 1.09 MG/DL — SIGNIFICANT CHANGE UP (ref 0.5–1.3)
D DIMER BLD IA.RAPID-MCNC: 163 NG/ML DDU — SIGNIFICANT CHANGE UP
EOSINOPHIL # BLD AUTO: 0.09 K/UL — SIGNIFICANT CHANGE UP (ref 0–0.5)
EOSINOPHIL NFR BLD AUTO: 1 % — SIGNIFICANT CHANGE UP (ref 0–6)
GLUCOSE SERPL-MCNC: 88 MG/DL — SIGNIFICANT CHANGE UP (ref 70–99)
HCT VFR BLD CALC: 45 % — SIGNIFICANT CHANGE UP (ref 39–50)
HGB BLD-MCNC: 15.3 G/DL — SIGNIFICANT CHANGE UP (ref 13–17)
IMM GRANULOCYTES NFR BLD AUTO: 0.2 % — SIGNIFICANT CHANGE UP (ref 0–1.5)
LYMPHOCYTES # BLD AUTO: 1.25 K/UL — SIGNIFICANT CHANGE UP (ref 1–3.3)
LYMPHOCYTES # BLD AUTO: 13.4 % — SIGNIFICANT CHANGE UP (ref 13–44)
MCHC RBC-ENTMCNC: 32.9 PG — SIGNIFICANT CHANGE UP (ref 27–34)
MCHC RBC-ENTMCNC: 34 GM/DL — SIGNIFICANT CHANGE UP (ref 32–36)
MCV RBC AUTO: 96.8 FL — SIGNIFICANT CHANGE UP (ref 80–100)
MONOCYTES # BLD AUTO: 0.56 K/UL — SIGNIFICANT CHANGE UP (ref 0–0.9)
MONOCYTES NFR BLD AUTO: 6 % — SIGNIFICANT CHANGE UP (ref 2–14)
NEUTROPHILS # BLD AUTO: 7.4 K/UL — SIGNIFICANT CHANGE UP (ref 1.8–7.4)
NEUTROPHILS NFR BLD AUTO: 79.2 % — HIGH (ref 43–77)
NRBC # BLD: 0 /100 WBCS — SIGNIFICANT CHANGE UP (ref 0–0)
PLATELET # BLD AUTO: 198 K/UL — SIGNIFICANT CHANGE UP (ref 150–400)
POTASSIUM SERPL-MCNC: 4.3 MMOL/L — SIGNIFICANT CHANGE UP (ref 3.5–5.3)
POTASSIUM SERPL-SCNC: 4.3 MMOL/L — SIGNIFICANT CHANGE UP (ref 3.5–5.3)
PROT SERPL-MCNC: 7.8 G/DL — SIGNIFICANT CHANGE UP (ref 6–8.3)
RBC # BLD: 4.65 M/UL — SIGNIFICANT CHANGE UP (ref 4.2–5.8)
RBC # FLD: 12.7 % — SIGNIFICANT CHANGE UP (ref 10.3–14.5)
SARS-COV-2 RNA SPEC QL NAA+PROBE: SIGNIFICANT CHANGE UP
SODIUM SERPL-SCNC: 138 MMOL/L — SIGNIFICANT CHANGE UP (ref 135–145)
TROPONIN I, HIGH SENSITIVITY RESULT: 4.8 NG/L — SIGNIFICANT CHANGE UP
TROPONIN I, HIGH SENSITIVITY RESULT: 4.9 NG/L — SIGNIFICANT CHANGE UP
WBC # BLD: 9.34 K/UL — SIGNIFICANT CHANGE UP (ref 3.8–10.5)
WBC # FLD AUTO: 9.34 K/UL — SIGNIFICANT CHANGE UP (ref 3.8–10.5)

## 2022-01-05 PROCEDURE — 71046 X-RAY EXAM CHEST 2 VIEWS: CPT

## 2022-01-05 PROCEDURE — 85379 FIBRIN DEGRADATION QUANT: CPT

## 2022-01-05 PROCEDURE — 99285 EMERGENCY DEPT VISIT HI MDM: CPT

## 2022-01-05 PROCEDURE — 93005 ELECTROCARDIOGRAM TRACING: CPT

## 2022-01-05 PROCEDURE — 99284 EMERGENCY DEPT VISIT MOD MDM: CPT | Mod: 25

## 2022-01-05 PROCEDURE — 71046 X-RAY EXAM CHEST 2 VIEWS: CPT | Mod: 26

## 2022-01-05 PROCEDURE — 84484 ASSAY OF TROPONIN QUANT: CPT

## 2022-01-05 PROCEDURE — 36415 COLL VENOUS BLD VENIPUNCTURE: CPT

## 2022-01-05 PROCEDURE — 80053 COMPREHEN METABOLIC PANEL: CPT

## 2022-01-05 PROCEDURE — 87635 SARS-COV-2 COVID-19 AMP PRB: CPT

## 2022-01-05 PROCEDURE — 85025 COMPLETE CBC W/AUTO DIFF WBC: CPT

## 2022-01-05 NOTE — ED PROVIDER NOTE - PATIENT PORTAL LINK FT
You can access the FollowMyHealth Patient Portal offered by [facility] by registering at the following website: [email or web address]. By joining [employer]’s FollowMyHealth portal, you will also be able to view your health information using other applications (apps) compatible with our system.

## 2022-01-05 NOTE — ED PROVIDER NOTE - OBJECTIVE STATEMENT
Patient reports around 6pm today, he was sitting, developed palpitations, like his heart pounding, then developed moderate left-sided chest pain that radiated to left arm and back. Lasted about 10 minutes. No fever, sob, cough, ap, n/v/d, diaphoresis. Currently asymptomatic.

## 2022-01-05 NOTE — ED PROVIDER NOTE - NSFOLLOWUPINSTRUCTIONS_ED_ALL_ED_FT
Dolor de pecho    LO QUE NECESITA SABER:    El dolor en el pecho puede ser provocado por bennett variedad de condiciones, algunas no tan serias y otras que son de peligro mortal. El dolor de pecho también puede ser un síntoma de un problema digestivo, beatriz la acidez o bennett úlcera estomacal. Un ataque de ansiedad o bennett emoción galileo, beatriz el enojo, también pueden provocar dolor de pecho. Bennett infección, inflamación o fractura en un hueso o cartílago en el pecho podría provocar dolor o molestia. En ocasiones el dolor torácico o la presión en el pecho pueden ser el resultado de leonardo circulación de la josy al corazón (angina). El dolor de pecho también puede ser causado por trastornos potencialmente mortales beatriz un ataque al corazón o un coágulo de josy en los pulmones.    INSTRUCCIONES SOBRE EL MISAEL HOSPITALARIA:    Llame al número local de emergencias (911 en los [place]) o pídale a alguien que llame si:  •Tiene alguno de los siguientes signos de un ataque cardíaco: ?Estrujamiento, presión o tensión en slaughter pecho      ?Usted también podría presentar alguno de los siguientes: ?Malestar o dolor en slaughter espalda, omid, mandíbula, abdomen, o brazo      ?Falta de aliento      ?Náuseas o vómitos      ?Desvanecimiento o sudor frío repentino            Regrese a la sammi de emergencias si:  •La inflamación en slaughter pecho empeora, aun con tratamiento.      •Usted tose o vomita josy.      •Enriqueta heces son negras o tienen josy.      •Usted no puede dejar de vomitar o le duele al tragar.      Llame a slaughter médico si:  •Usted tiene preguntas o inquietudes acerca de slaughter condición o cuidado.          Medicamentos:  •Los medicamentospueden administrarse para tratar la causa del dolor de pecho. Por ejemplo, analgésicos, medicamentos para la ansiedad o medicamentos para aumentar el flujo de josy al corazón.      •No tome ciertos medicamentos sin antes preguntarle a slaughter médico.Estos incluyen MAURICIO, suplementos vitamínicos o a base de hierbas u hormonas (estrógeno o progestágeno).      •[place] enriqueta medicamentos beatriz se le haya indicado.Consulte con slaughter médico si usted rodrick que slaughter medicamento no le está ayudando o si presenta efectos secundarios. Infórmele si es alérgico a cualquier medicamento. Mantenga bennett lista actualizada de los medicamentos, las vitaminas y los productos herbales que sissy. Incluya los siguientes datos de los medicamentos: cantidad, frecuencia y motivo de administración. Traiga con usted la lista o los envases de las píldoras a enriqueta citas de seguimiento. Lleve la lista de los medicamentos con usted en nica de bennett emergencia.      Consejos para vivir saludable:Los siguientes son consejos generales de charlie. Si se conoce la causa de slaughter dolor de pecho, slaughter médico le dará pautas específicas a seguir.  •No fume.La nicotina y otros químicos contenidos en los cigarrillos y cigarros pueden causar daño a enriqueta pulmones y el corazón. Pida información a slaughter médico si usted actualmente fuma y necesita ayuda para dejar de fumar. Los cigarrillos electrónicos o el tabaco sin humo igualmente contienen nicotina. Consulte con slaughter médico antes de utilizar estos productos.      •Elija bennett variedad de alimentos saludables tan a menudo beatriz sea posible.Incluya frutas y verduras frescas, congeladas o enlatadas. También incluya productos lácteos bajos en grasa, pescado, juan jose (sin piel) y radha magras. Slaughter médico o dietista pueden ayudarlo a crear planes de alimentos. Es posible que tenga que evitar ciertos alimentos o bebidas si el dolor es causado por un problema de digestión.  Alimentos saludables           •Reduzca el consumo de sodio (sal).Limite el consumo de alimentos altos en sodio, beatriz comidas enlatadas, bocadillos salados y embutidos. Si añade paolo cuando cocina la comida, no añada más en la patterson. Elija alimentos enlatados bajos en sodio tanto beatriz sea posible.             •Consuma abundante agua al día.El agua ayuda al cuerpo a controlar la temperatura y la presión arterial. Pregunte a slaughter médico cuál es la cantidad de agua que debería consumir cada día.      •Pregunte acerca de la actividad.Slaughter médico le dirá cuáles actividades limitar y cuáles evitar. Pregunte cuándo puede manejar, regresar a slaughter trabajo y tener relaciones sexuales. Pida más información acerca de un plan de ejercicio adecuado para usted.      •Mantenga un peso saludable.Pregúntele a slaughter médico cuál es el peso ideal para usted. Pídale que lo ayude a crear un plan seguro para bajar de peso si tiene sobrepeso.      •Pregunte sobre las vacunas que pudiera necesitar.Vacúnese contra la influenza (gripe) todos los años tan pronto beatriz se recomiende, normalmente en septiembre u octubre. Usted también podría necesitar la vacuna antineumocócica para evitar la neumonía. La vacuna se administra generalmente cada 5 años, a partir de los 65 años. Slaughter médico puede indicarle si debe recibir otras vacunas, y cuándo aplicárselas.      Programe bennett sue con slaughter médico dentro de 72 horas o beatriz se le indique:Es posible que deba regresar para hacerse más pruebas para encontrar la causa del dolor de pecho. Es probable que lo refieran a un especialista, beatriz un cardiólogo o un gastroenterólogo. Anote enriqueta preguntas para que se acuerde de hacerlas felecia enriqueta visitas.       © Copyright [employer] 2022           back to top                          © Copyright [employer] 2022

## 2022-01-05 NOTE — ED PROVIDER NOTE - PROGRESS NOTE DETAILS
Patient is resting comfortably, NAD. Remains asymptomatic. Will check 2nd troponin. If negative, will d/c to f/u with cardiology. 2nd troponin negative. Patient resting comfortably. Will refer to cardiology. Return to the ED immediately if getting worse, not improving, or if having any new or troubling symptoms.

## 2025-05-09 NOTE — H&P ADULT - NSHPPOAPULMEMBOLUS_GEN_A_CORE
Pt did not want to schedule due to needing an OV at [place] instead of [facility]. Patient prefers to schedule with an MD based out of [facility]. Informed pt to contact PCP for a new referral.    no

## 2025-05-12 NOTE — ED ADULT TRIAGE NOTE - HEIGHT IN CM
Burton Callicott is a 57 y.o. male who is seen as a consult at the request of JAGRUTI Segovia for Consult.      HPI:  History of Present Illness  Pt presents today for evaluation of a perianal bump. He states that approximately 8-10 months ago, he developed irritation around the anus. He saw a dermatologist and was told that he had an anal fissure. He states that the fissure resolved with time. Then approximately 1.5 months ago, he noticed a left-sided perianal bump. He denies any associated tenderness/pain, bleeding, or purulent drainage. He did not use any creams or suppositories and the bump spontaneously resolved after a month. He states that he was going to cancel today's appointment, but wanted to make sure that there was not anything cancerous present.     Pt typically has 1 BM daily, but can have up to 2-3. Petroleum: 4. He denies any straining during defecation or heavy lifting. He reports maintaining a healthy diet consisting of a lot of vegetables. Not taking any fiber supplements, stool softeners, or laxatives.     Not taking any anticoagulation. No previous anorectal procedures. Never had a colonoscopy, but did screen with Cologuard 2 years ago, which was negative. No known FHx of colon polyps, colon cancer, or IBD.         Past Medical History:   Diagnosis Date   • Alkaline phosphatase elevation 06/2021    max elevation 180 IU/L high normal being 121 IU/L   • Allergic 6/21    skin rash   • Cancer     basal cell and squamous cell carcinoma   • Fissure, anal Jan 2025    Had dermitologist take a look after experiencing itchiness and soreness.       Past Surgical History:   Procedure Laterality Date   • MOHS SURGERY      4/2021   • VASECTOMY  6/14   • WISDOM TOOTH EXTRACTION         Social History:   reports that he has never smoked. He has never been exposed to tobacco smoke. He has never used smokeless tobacco. He reports current alcohol use of about 3.0 standard drinks of alcohol per week. He  reports that he does not use drugs.      Marriage status:     Family History   Problem Relation Age of Onset   • Cancer Mother         Breast Cancer   • Cancer Father         Prostate   • Hypertension Father    • Cancer Paternal Grandfather         Prostate         Current Outpatient Medications:   •  Ascorbic Acid (Vitamin C) 500 MG capsule, Take  by mouth., Disp: , Rfl:   •  Cholecalciferol (Vitamin D3) 50 MCG (2000 UT) capsule, Take 1 capsule by mouth Daily., Disp: , Rfl:   •  Turmeric 400 MG capsule, Take  by mouth., Disp: , Rfl:     Allergy  Patient has no known allergies.      Vitals:    05/12/25 1007   BP: 120/70   Pulse: 65   SpO2: 97%     Body mass index is 22.39 kg/m².      Physical Exam    Physical Exam  Exam conducted with a chaperone present.   Constitutional:       General: He is not in acute distress.     Appearance: He is well-developed.   HENT:      Head: Normocephalic and atraumatic.      Nose: Nose normal.   Eyes:      Conjunctiva/sclera: Conjunctivae normal.      Pupils: Pupils are equal, round, and reactive to light.   Neck:      Trachea: No tracheal deviation.   Pulmonary:      Effort: Pulmonary effort is normal. No respiratory distress.      Breath sounds: Normal breath sounds.   Abdominal:      General: Bowel sounds are normal. There is no distension.      Palpations: Abdomen is soft.   Genitourinary:     Comments: Perianal exam: Mild perianal irritation. External hemorrhoids WNL. No obvious anal fissures.   SHARI- Good tone, no masses  Anoscopy performed: Internal hemorrhoids WNL. No evidence of disease.   Musculoskeletal:         General: No deformity. Normal range of motion.      Cervical back: Normal range of motion.   Skin:     General: Skin is warm and dry.   Neurological:      Mental Status: He is alert and oriented to person, place, and time.      Cranial Nerves: No cranial nerve deficit.      Coordination: Coordination normal.      Gait: Gait normal.   Psychiatric:          Behavior: Behavior normal.         Judgment: Judgment normal.       Review of Medical Record:  I reviewed medical records as detailed in HPI.     Assessment & Plan    Assessment:   1. Perianal irritation    - New    Plan:  - Discussed physical exam findings with Pt and that the perianal lump was likely an enlarged external hemorrhoid. The area has since resolved spontaneously and he is currently asymptomatic. Discussed common causes of hemorrhoid enlargement and ways to decrease risk of future flare-ups.   - Pt has never had a colonoscopy and has only screened using Cologuard. Did discuss the limitations of Cologuard and offered to schedule a colonoscopy, but Pt declined. Pt instructed to call our office if he changes his mind.   - Follow up as needed for any new or worsening symptoms.    165.1

## 2025-06-02 NOTE — ED ADULT TRIAGE NOTE - STATUS:
[facility] [place] at [facility]   [address], [address] [place] [id number]   W: [phone]  F: [phone]     Reason for Visit:   Phuc Wang Applied